# Patient Record
Sex: FEMALE | Race: WHITE | Employment: PART TIME | URBAN - METROPOLITAN AREA
[De-identification: names, ages, dates, MRNs, and addresses within clinical notes are randomized per-mention and may not be internally consistent; named-entity substitution may affect disease eponyms.]

---

## 2019-06-13 PROBLEM — Z34.90 PREGNANCY: Status: ACTIVE | Noted: 2019-06-13

## 2019-06-17 PROBLEM — Z28.39 RUBELLA NON-IMMUNE STATUS, ANTEPARTUM: Status: ACTIVE | Noted: 2019-06-17

## 2019-06-17 PROBLEM — O09.899 RUBELLA NON-IMMUNE STATUS, ANTEPARTUM: Status: ACTIVE | Noted: 2019-06-17

## 2019-06-20 PROBLEM — O09.91 HIGH-RISK PREGNANCY IN FIRST TRIMESTER: Status: ACTIVE | Noted: 2019-06-13

## 2019-06-20 PROBLEM — O99.211 OBESITY AFFECTING PREGNANCY IN FIRST TRIMESTER: Status: ACTIVE | Noted: 2019-06-13

## 2019-06-21 PROBLEM — Z36.82 NUCHAL TRANSLUCENCY OF FETUS ON PRENATAL ULTRASOUND: Status: ACTIVE | Noted: 2019-06-21

## 2019-06-24 PROBLEM — Z86.69 HISTORY OF SEIZURES AS A CHILD: Status: ACTIVE | Noted: 2019-06-24

## 2019-08-06 PROBLEM — Z36.82 NUCHAL TRANSLUCENCY OF FETUS ON PRENATAL ULTRASOUND: Status: RESOLVED | Noted: 2019-06-21 | Resolved: 2019-08-06

## 2019-08-06 PROBLEM — Z86.69 HISTORY OF SEIZURES AS A CHILD: Status: RESOLVED | Noted: 2019-06-24 | Resolved: 2019-08-06

## 2019-08-07 PROBLEM — O09.92 HIGH-RISK PREGNANCY IN SECOND TRIMESTER: Status: ACTIVE | Noted: 2019-06-13

## 2019-08-07 PROBLEM — O99.212 OBESITY AFFECTING PREGNANCY IN SECOND TRIMESTER: Status: ACTIVE | Noted: 2019-06-13

## 2019-08-28 PROBLEM — Z23 ENCOUNTER FOR IMMUNIZATION: Status: ACTIVE | Noted: 2019-08-28

## 2019-09-26 PROBLEM — R03.0 ELEVATED BP WITHOUT DIAGNOSIS OF HYPERTENSION: Status: ACTIVE | Noted: 2019-09-26

## 2019-10-02 PROBLEM — O16.2 ELEVATED BLOOD PRESSURE AFFECTING PREGNANCY IN SECOND TRIMESTER, ANTEPARTUM: Status: ACTIVE | Noted: 2019-09-26

## 2019-10-29 PROBLEM — O09.93 HIGH-RISK PREGNANCY IN THIRD TRIMESTER: Status: ACTIVE | Noted: 2019-06-13

## 2019-10-29 PROBLEM — O16.3 ELEVATED BLOOD PRESSURE AFFECTING PREGNANCY IN THIRD TRIMESTER, ANTEPARTUM: Status: ACTIVE | Noted: 2019-09-26

## 2019-11-01 PROBLEM — O99.213 OBESITY AFFECTING PREGNANCY IN THIRD TRIMESTER: Status: ACTIVE | Noted: 2019-06-13

## 2019-11-29 ENCOUNTER — HOSPITAL ENCOUNTER (EMERGENCY)
Age: 29
Discharge: HOME | End: 2019-11-29
Attending: OBSTETRICS & GYNECOLOGY | Admitting: OBSTETRICS & GYNECOLOGY
Payer: COMMERCIAL

## 2019-11-29 LAB
COLLECT DURATION TIME UR: 24 HR
PROT 24H UR-MRATE: 203 MG/24HR
PROT UR-MCNC: 15 MG/DL
SPECIMEN VOL ?TM UR: 1350 ML

## 2019-11-29 PROCEDURE — 84156 ASSAY OF PROTEIN URINE: CPT

## 2019-11-29 PROCEDURE — 59025 FETAL NON-STRESS TEST: CPT

## 2019-11-29 NOTE — PROGRESS NOTES
Pt here for NST. 24 hour urine sent to lab. Pt does not have to wait on results before d/c per Dr. Concepcion Medina.

## 2019-12-06 PROBLEM — B95.1 POSITIVE GBS TEST: Status: ACTIVE | Noted: 2019-12-06

## 2019-12-23 ENCOUNTER — HOSPITAL ENCOUNTER (INPATIENT)
Age: 29
LOS: 4 days | Discharge: HOME | End: 2019-12-27
Attending: OBSTETRICS & GYNECOLOGY | Admitting: OBSTETRICS & GYNECOLOGY
Payer: COMMERCIAL

## 2019-12-23 DIAGNOSIS — O10.919 HTN IN PREGNANCY, CHRONIC: Primary | ICD-10-CM

## 2019-12-23 PROBLEM — Z3A.39 39 WEEKS GESTATION OF PREGNANCY: Status: ACTIVE | Noted: 2019-12-23

## 2019-12-23 PROBLEM — Z34.90 ENCOUNTER FOR PLANNED INDUCTION OF LABOR: Status: ACTIVE | Noted: 2019-12-23

## 2019-12-23 LAB
ABO + RH BLD: NORMAL
BLOOD BANK CMNT PATIENT-IMP: NORMAL
BLOOD GROUP ANTIBODIES SERPL: NORMAL
ERYTHROCYTE [DISTWIDTH] IN BLOOD BY AUTOMATED COUNT: 13.3 % (ref 11.9–14.6)
HCT VFR BLD AUTO: 35 % (ref 35.8–46.3)
HGB BLD-MCNC: 11.9 G/DL (ref 11.7–15.4)
MCH RBC QN AUTO: 29.9 PG (ref 26.1–32.9)
MCHC RBC AUTO-ENTMCNC: 34 G/DL (ref 31.4–35)
MCV RBC AUTO: 87.9 FL (ref 79.6–97.8)
NRBC # BLD: 0 K/UL (ref 0–0.2)
PLATELET # BLD AUTO: 230 K/UL (ref 150–450)
PMV BLD AUTO: 10.1 FL (ref 9.4–12.3)
RBC # BLD AUTO: 3.98 M/UL (ref 4.05–5.2)
SPECIMEN EXP DATE BLD: NORMAL
WBC # BLD AUTO: 13.9 K/UL (ref 4.3–11.1)

## 2019-12-23 PROCEDURE — 65270000029 HC RM PRIVATE

## 2019-12-23 PROCEDURE — 86900 BLOOD TYPING SEROLOGIC ABO: CPT

## 2019-12-23 PROCEDURE — 74011250637 HC RX REV CODE- 250/637: Performed by: OBSTETRICS & GYNECOLOGY

## 2019-12-23 PROCEDURE — 75410000002 HC LABOR FEE PER 1 HR: Performed by: OBSTETRICS & GYNECOLOGY

## 2019-12-23 PROCEDURE — 85027 COMPLETE CBC AUTOMATED: CPT

## 2019-12-23 PROCEDURE — 36415 COLL VENOUS BLD VENIPUNCTURE: CPT

## 2019-12-23 RX ORDER — SODIUM CHLORIDE 0.9 % (FLUSH) 0.9 %
5-40 SYRINGE (ML) INJECTION EVERY 8 HOURS
Status: DISCONTINUED | OUTPATIENT
Start: 2019-12-23 | End: 2019-12-25

## 2019-12-23 RX ORDER — OXYTOCIN/RINGER'S LACTATE 30/500 ML
0-25 PLASTIC BAG, INJECTION (ML) INTRAVENOUS
Status: DISCONTINUED | OUTPATIENT
Start: 2019-12-23 | End: 2019-12-25 | Stop reason: HOSPADM

## 2019-12-23 RX ORDER — LIDOCAINE HYDROCHLORIDE 20 MG/ML
JELLY TOPICAL
Status: ACTIVE | OUTPATIENT
Start: 2019-12-23 | End: 2019-12-24

## 2019-12-23 RX ORDER — MINERAL OIL
120 OIL (ML) ORAL
Status: ACTIVE | OUTPATIENT
Start: 2019-12-23 | End: 2019-12-24

## 2019-12-23 RX ORDER — OXYTOCIN/RINGER'S LACTATE 15/250 ML
250 PLASTIC BAG, INJECTION (ML) INTRAVENOUS ONCE
Status: ACTIVE | OUTPATIENT
Start: 2019-12-23 | End: 2019-12-24

## 2019-12-23 RX ORDER — BUTORPHANOL TARTRATE 2 MG/ML
1 INJECTION INTRAMUSCULAR; INTRAVENOUS
Status: DISCONTINUED | OUTPATIENT
Start: 2019-12-23 | End: 2019-12-25 | Stop reason: HOSPADM

## 2019-12-23 RX ORDER — LIDOCAINE HYDROCHLORIDE 10 MG/ML
1 INJECTION INFILTRATION; PERINEURAL
Status: ACTIVE | OUTPATIENT
Start: 2019-12-23 | End: 2019-12-24

## 2019-12-23 RX ORDER — SODIUM CHLORIDE 0.9 % (FLUSH) 0.9 %
5-40 SYRINGE (ML) INJECTION AS NEEDED
Status: DISCONTINUED | OUTPATIENT
Start: 2019-12-23 | End: 2019-12-25

## 2019-12-23 RX ORDER — DEXTROSE, SODIUM CHLORIDE, SODIUM LACTATE, POTASSIUM CHLORIDE, AND CALCIUM CHLORIDE 5; .6; .31; .03; .02 G/100ML; G/100ML; G/100ML; G/100ML; G/100ML
125 INJECTION, SOLUTION INTRAVENOUS CONTINUOUS
Status: DISCONTINUED | OUTPATIENT
Start: 2019-12-23 | End: 2019-12-25

## 2019-12-23 RX ADMIN — MISOPROSTOL 50 MCG: 100 TABLET ORAL at 18:55

## 2019-12-23 RX ADMIN — MISOPROSTOL 50 MCG: 100 TABLET ORAL at 23:11

## 2019-12-23 NOTE — PROGRESS NOTES
SVE as documented. Cytotec given as ordered. Dr. Sondra Aquino called and updated. PCN to be given at second dose of cytotec or sooner if pt is progressing.

## 2019-12-23 NOTE — PROGRESS NOTES
Pt presented for scheduled induction. Pt states she had a phone interview with [address] [place] [address] RN and the information that was obtained has not changed. POC reviewed. IV started, Consents witnessed. Lab work drawn, sent to lab.

## 2019-12-24 ENCOUNTER — ANESTHESIA (OUTPATIENT)
Dept: LABOR AND DELIVERY | Age: 29
End: 2019-12-24
Payer: COMMERCIAL

## 2019-12-24 ENCOUNTER — ANESTHESIA EVENT (OUTPATIENT)
Dept: LABOR AND DELIVERY | Age: 29
End: 2019-12-24
Payer: COMMERCIAL

## 2019-12-24 LAB
ALBUMIN SERPL-MCNC: 2.6 G/DL (ref 3.5–5)
ALBUMIN/GLOB SERPL: 0.7 {RATIO} (ref 1.2–3.5)
ALP SERPL-CCNC: 169 U/L (ref 50–136)
ALT SERPL-CCNC: 20 U/L (ref 12–65)
ANION GAP SERPL CALC-SCNC: 7 MMOL/L (ref 7–16)
AST SERPL-CCNC: 16 U/L (ref 15–37)
BILIRUB SERPL-MCNC: 0.3 MG/DL (ref 0.2–1.1)
BUN SERPL-MCNC: 12 MG/DL (ref 6–23)
CALCIUM SERPL-MCNC: 9.3 MG/DL (ref 8.3–10.4)
CHLORIDE SERPL-SCNC: 107 MMOL/L (ref 98–107)
CO2 SERPL-SCNC: 23 MMOL/L (ref 21–32)
CREAT SERPL-MCNC: 0.68 MG/DL (ref 0.6–1)
GLOBULIN SER CALC-MCNC: 3.8 G/DL (ref 2.3–3.5)
GLUCOSE SERPL-MCNC: 90 MG/DL (ref 65–100)
POTASSIUM SERPL-SCNC: 4.3 MMOL/L (ref 3.5–5.1)
PROT SERPL-MCNC: 6.4 G/DL (ref 6.3–8.2)
SODIUM SERPL-SCNC: 137 MMOL/L (ref 136–145)

## 2019-12-24 PROCEDURE — 77030032490 HC SLV COMPR SCD KNE COVD -B: Performed by: OBSTETRICS & GYNECOLOGY

## 2019-12-24 PROCEDURE — 77030014125 HC TY EPDRL BBMI -B: Performed by: NURSE ANESTHETIST, CERTIFIED REGISTERED

## 2019-12-24 PROCEDURE — 74011250637 HC RX REV CODE- 250/637: Performed by: OBSTETRICS & GYNECOLOGY

## 2019-12-24 PROCEDURE — 74011000250 HC RX REV CODE- 250: Performed by: ANESTHESIOLOGY

## 2019-12-24 PROCEDURE — 74011250636 HC RX REV CODE- 250/636: Performed by: NURSE ANESTHETIST, CERTIFIED REGISTERED

## 2019-12-24 PROCEDURE — 77030018836 HC SOL IRR NACL ICUM -A: Performed by: OBSTETRICS & GYNECOLOGY

## 2019-12-24 PROCEDURE — 75410000002 HC LABOR FEE PER 1 HR: Performed by: OBSTETRICS & GYNECOLOGY

## 2019-12-24 PROCEDURE — 77030002933 HC SUT MCRYL J&J -A: Performed by: OBSTETRICS & GYNECOLOGY

## 2019-12-24 PROCEDURE — 74011250636 HC RX REV CODE- 250/636: Performed by: OBSTETRICS & GYNECOLOGY

## 2019-12-24 PROCEDURE — 3E0P7VZ INTRODUCTION OF HORMONE INTO FEMALE REPRODUCTIVE, VIA NATURAL OR ARTIFICIAL OPENING: ICD-10-PCS | Performed by: OBSTETRICS & GYNECOLOGY

## 2019-12-24 PROCEDURE — 76060000078 HC EPIDURAL ANESTHESIA: Performed by: OBSTETRICS & GYNECOLOGY

## 2019-12-24 PROCEDURE — 76010000392 HC C SECN EA ADDL 0.5 HR: Performed by: OBSTETRICS & GYNECOLOGY

## 2019-12-24 PROCEDURE — 77030031139 HC SUT VCRL2 J&J -A: Performed by: OBSTETRICS & GYNECOLOGY

## 2019-12-24 PROCEDURE — 74011000250 HC RX REV CODE- 250: Performed by: NURSE ANESTHETIST, CERTIFIED REGISTERED

## 2019-12-24 PROCEDURE — 75410000003 HC RECOV DEL/VAG/CSECN EA 0.5 HR: Performed by: OBSTETRICS & GYNECOLOGY

## 2019-12-24 PROCEDURE — 80053 COMPREHEN METABOLIC PANEL: CPT

## 2019-12-24 PROCEDURE — 36415 COLL VENOUS BLD VENIPUNCTURE: CPT

## 2019-12-24 PROCEDURE — 74011000258 HC RX REV CODE- 258: Performed by: OBSTETRICS & GYNECOLOGY

## 2019-12-24 PROCEDURE — 65270000029 HC RM PRIVATE

## 2019-12-24 PROCEDURE — 77030019905 HC CATH URETH INTMIT MDII -A

## 2019-12-24 PROCEDURE — 77030018846 HC SOL IRR STRL H20 ICUM -A: Performed by: OBSTETRICS & GYNECOLOGY

## 2019-12-24 PROCEDURE — 76010000391 HC C SECN FIRST 1 HR: Performed by: OBSTETRICS & GYNECOLOGY

## 2019-12-24 PROCEDURE — 77030034696 HC CATH URETH FOL 2W BARD -A: Performed by: OBSTETRICS & GYNECOLOGY

## 2019-12-24 PROCEDURE — A4300 CATH IMPL VASC ACCESS PORTAL: HCPCS | Performed by: NURSE ANESTHETIST, CERTIFIED REGISTERED

## 2019-12-24 PROCEDURE — 82803 BLOOD GASES ANY COMBINATION: CPT

## 2019-12-24 RX ORDER — LIDOCAINE HYDROCHLORIDE AND EPINEPHRINE 15; 5 MG/ML; UG/ML
INJECTION, SOLUTION EPIDURAL
Status: COMPLETED | OUTPATIENT
Start: 2019-12-24 | End: 2019-12-24

## 2019-12-24 RX ORDER — CEFAZOLIN SODIUM/WATER 2 G/20 ML
2 SYRINGE (ML) INTRAVENOUS ONCE
Status: DISCONTINUED | OUTPATIENT
Start: 2019-12-25 | End: 2019-12-24

## 2019-12-24 RX ORDER — PHENYLEPHRINE HYDROCHLORIDE 10 MG/ML
INJECTION INTRAVENOUS AS NEEDED
Status: DISCONTINUED | OUTPATIENT
Start: 2019-12-24 | End: 2019-12-25 | Stop reason: HOSPADM

## 2019-12-24 RX ORDER — ONDANSETRON 2 MG/ML
INJECTION INTRAMUSCULAR; INTRAVENOUS AS NEEDED
Status: DISCONTINUED | OUTPATIENT
Start: 2019-12-24 | End: 2019-12-25 | Stop reason: HOSPADM

## 2019-12-24 RX ORDER — ROPIVACAINE HYDROCHLORIDE 2 MG/ML
INJECTION, SOLUTION EPIDURAL; INFILTRATION; PERINEURAL
Status: DISCONTINUED | OUTPATIENT
Start: 2019-12-24 | End: 2019-12-25 | Stop reason: HOSPADM

## 2019-12-24 RX ORDER — LIDOCAINE HYDROCHLORIDE AND EPINEPHRINE 20; 5 MG/ML; UG/ML
INJECTION, SOLUTION EPIDURAL; INFILTRATION; INTRACAUDAL; PERINEURAL AS NEEDED
Status: DISCONTINUED | OUTPATIENT
Start: 2019-12-24 | End: 2019-12-25 | Stop reason: HOSPADM

## 2019-12-24 RX ORDER — SODIUM CHLORIDE, SODIUM LACTATE, POTASSIUM CHLORIDE, CALCIUM CHLORIDE 600; 310; 30; 20 MG/100ML; MG/100ML; MG/100ML; MG/100ML
INJECTION, SOLUTION INTRAVENOUS
Status: DISCONTINUED | OUTPATIENT
Start: 2019-12-24 | End: 2019-12-25 | Stop reason: HOSPADM

## 2019-12-24 RX ADMIN — PENICILLIN G POTASSIUM 2.5 MILLION UNITS: 20000000 POWDER, FOR SOLUTION INTRAVENOUS at 08:38

## 2019-12-24 RX ADMIN — LIDOCAINE HYDROCHLORIDE,EPINEPHRINE BITARTRATE 5 ML: 20; .005 INJECTION, SOLUTION EPIDURAL; INFILTRATION; INTRACAUDAL; PERINEURAL at 23:24

## 2019-12-24 RX ADMIN — PENICILLIN G POTASSIUM 2.5 MILLION UNITS: 20000000 POWDER, FOR SOLUTION INTRAVENOUS at 12:25

## 2019-12-24 RX ADMIN — Medication 500 ML/HR: at 23:48

## 2019-12-24 RX ADMIN — CEFAZOLIN 3 G: 1 INJECTION, POWDER, FOR SOLUTION INTRAVENOUS at 23:13

## 2019-12-24 RX ADMIN — SODIUM CHLORIDE 5 MILLION UNITS: 900 INJECTION, SOLUTION INTRAVENOUS at 00:05

## 2019-12-24 RX ADMIN — LIDOCAINE HYDROCHLORIDE,EPINEPHRINE BITARTRATE 5 ML: 20; .005 INJECTION, SOLUTION EPIDURAL; INFILTRATION; INTRACAUDAL; PERINEURAL at 23:44

## 2019-12-24 RX ADMIN — LIDOCAINE HYDROCHLORIDE,EPINEPHRINE BITARTRATE 3.5 ML: 15; .005 INJECTION, SOLUTION EPIDURAL; INFILTRATION; INTRACAUDAL; PERINEURAL at 12:00

## 2019-12-24 RX ADMIN — Medication 8 ML/HR: at 12:00

## 2019-12-24 RX ADMIN — SODIUM CHLORIDE, SODIUM LACTATE, POTASSIUM CHLORIDE, CALCIUM CHLORIDE, AND DEXTROSE MONOHYDRATE 125 ML/HR: 600; 310; 30; 20; 5 INJECTION, SOLUTION INTRAVENOUS at 12:27

## 2019-12-24 RX ADMIN — LIDOCAINE HYDROCHLORIDE,EPINEPHRINE BITARTRATE 5 ML: 20; .005 INJECTION, SOLUTION EPIDURAL; INFILTRATION; INTRACAUDAL; PERINEURAL at 23:22

## 2019-12-24 RX ADMIN — PHENYLEPHRINE HYDROCHLORIDE 160 MCG: 10 INJECTION INTRAVENOUS at 23:59

## 2019-12-24 RX ADMIN — AZITHROMYCIN MONOHYDRATE 500 MG: 500 INJECTION, POWDER, LYOPHILIZED, FOR SOLUTION INTRAVENOUS at 23:13

## 2019-12-24 RX ADMIN — SODIUM CHLORIDE, SODIUM LACTATE, POTASSIUM CHLORIDE, AND CALCIUM CHLORIDE: 600; 310; 30; 20 INJECTION, SOLUTION INTRAVENOUS at 23:24

## 2019-12-24 RX ADMIN — MISOPROSTOL 50 MCG: 100 TABLET ORAL at 04:12

## 2019-12-24 RX ADMIN — PENICILLIN G POTASSIUM 2.5 MILLION UNITS: 20000000 POWDER, FOR SOLUTION INTRAVENOUS at 22:45

## 2019-12-24 RX ADMIN — LIDOCAINE HYDROCHLORIDE,EPINEPHRINE BITARTRATE 5 ML: 20; .005 INJECTION, SOLUTION EPIDURAL; INFILTRATION; INTRACAUDAL; PERINEURAL at 23:18

## 2019-12-24 RX ADMIN — PHENYLEPHRINE HYDROCHLORIDE 160 MCG: 10 INJECTION INTRAVENOUS at 23:41

## 2019-12-24 RX ADMIN — Medication 2 MILLI-UNITS/MIN: at 09:17

## 2019-12-24 RX ADMIN — PHENYLEPHRINE HYDROCHLORIDE 160 MCG: 10 INJECTION INTRAVENOUS at 23:50

## 2019-12-24 RX ADMIN — PENICILLIN G POTASSIUM 2.5 MILLION UNITS: 20000000 POWDER, FOR SOLUTION INTRAVENOUS at 18:31

## 2019-12-24 RX ADMIN — ONDANSETRON 4 MG: 2 INJECTION INTRAMUSCULAR; INTRAVENOUS at 23:48

## 2019-12-24 RX ADMIN — PENICILLIN G POTASSIUM 2.5 MILLION UNITS: 20000000 POWDER, FOR SOLUTION INTRAVENOUS at 04:08

## 2019-12-24 NOTE — PROGRESS NOTES
[facility] Update given. Strip reviewed. Md states to continue increasing pitocin.

## 2019-12-24 NOTE — H&P
[facility]havin [facility]  [id number]      History and Physical  Subjective:     Patient is a 30 y.o.  female presents with induction for CHTN at 39 weeks. See office notes on prenatal care. Has felt some cramping overnight was checked on Friday and noted to be 1/70  CMP pending  CBC normal   No s/s of PEC  Lab Results   Component Value Date/Time    ABO/Rh(D) O POSITIVE 12/23/2019 06:34 PM    Antibody screen, External negative 06/13/2019    Antibody screen NEG 12/23/2019 06:34 PM    Rubella, External non immune 06/13/2019    GrBStrep, External Positive 12/03/2019    HBsAg, External negative 06/13/2019    HIV, External non reactive  06/13/2019    RPR, External non reactive 06/13/2019    ABO,Rh O positive 06/13/2019               Patient Active Problem List    Diagnosis Date Noted    HTN in pregnancy, chronic 12/23/2019    39 weeks gestation of pregnancy 12/23/2019    Encounter for planned induction of labor 12/23/2019    Positive GBS test 12/06/2019    Elevated blood pressure affecting pregnancy in third trimester, antepartum 09/26/2019    Encounter for immunization 08/28/2019    Rubella non-immune status, antepartum 06/17/2019    High-risk pregnancy in third trimester 06/13/2019    Obesity affecting pregnancy in third trimester 06/13/2019     Past Medical History:   Diagnosis Date    Epilepsy [facility])     as a child - nothing since    History of seizures as a child 6/24/2019 6/24/2019 at [facility]: Was followed by neuro and treated with Keppra, none since.  HTN in pregnancy, chronic 12/23/2019      Past Surgical History:   Procedure Laterality Date    HX OTHER SURGICAL      Right arm (plate and 4 screws due to broken arm)    HX TONSIL AND ADENOIDECTOMY        Prior to Admission Medications   Prescriptions Last Dose Informant Patient Reported?  Taking?   calcium carbonate (CALCIUM 500 PO) 12/23/2019 at Unknown time  Yes Yes   Sig: Take  by mouth.   calcium carbonate (TUMS) 200 mg calcium (500 mg) chew 12/23/2019 at Unknown time  Yes Yes   Sig: Take 1 Tab by mouth daily. cholecalciferol (VITAMIN D3) 2,000 unit cap capsule 12/23/2019 at Unknown time  Yes Yes   Sig: Take  by mouth two (2) times a day. ferrous sulfate ER (SLOW RELEASE IRON) 160 mg (50 mg iron) TbER tablet 12/16/2019 at Unknown time  Yes Yes   Sig: Take 1 Tab by mouth every Tuesday and Friday. ondansetron (ZOFRAN ODT) 4 mg disintegrating tablet 12/16/2019 at Unknown time  No Yes   Sig: Take 1 Tab by mouth every eight (8) hours as needed for Nausea. Indications: Excessive Vomiting in Pregnancy   prenatal 47/iron/folate 1/dha (PNV-DHA PO) 12/23/2019 at Unknown time  Yes Yes   Sig: Take  by mouth. Facility-Administered Medications: None     No Known Allergies   Social History     Tobacco Use    Smoking status: Never Smoker    Smokeless tobacco: Never Used   Substance Use Topics    Alcohol use: Not Currently      Family History   Problem Relation Age of Onset    Hypertension Mother     Diabetes Mother     Diabetes Father           Objective:     Patient Vitals for the past 8 hrs:   BP Temp Pulse Resp   12/24/19 0841 140/75 98.8 °F (37.1 °C) 80 18        Fetal Monitoring:    Patient Vitals for the past 4 hrs:    Mode Fetal Heart Rate Variability Decelerations Accelerations   12/24/19 0630 External 140 6-25 BPM None Yes   12/24/19 0615 External 130 6-25 BPM None Yes   12/24/19 0600 External 130 6-25 BPM None Yes   12/24/19 0545 External 125 6-25 BPM None Yes   12/24/19 0530  125 6-25 BPM None Yes   12/24/19 0515 External 125 6-25 BPM None Yes      Uterine Activity: Frequency: Every 1 on monitor- heath- pt not feeling minutes   Dilation: 2 -3cm   Effacement: 50%   Station: -3  AROM clear  Assessment:     Principal Problem:    HTN in pregnancy, chronic (12/23/2019)    Active Problems:    39 weeks gestation of pregnancy (12/23/2019)      Encounter for planned induction of labor (12/23/2019)        Plan:     Reassuring fetal status, Labor Progressing normally, Continue plan for vaginal delivery   Pts other in room with same body habitus and had vaginal delivery.   Recent EFW- 2 weeks ago 3305 grams HC 81%, AC 72% 7'5\" EFW 62%  Pt passed both glucolas  Weight gain 34 pounds

## 2019-12-24 NOTE — PROGRESS NOTES
12/24/19 0904   Membranes   Membrane Status AROM   Rupture Date 12/24/19   Rupture Time 0904   Odor None   Amniotic Fluid Description Clear   Amniotic Fluid Volume  Moderate

## 2019-12-24 NOTE — PROGRESS NOTES
08:38 Medication New Bag penicillin G pot (PFIZERPEN) 2.5 Million Units in 50 ml 0.9% NaCl -  Dose: 2.5 Million Units ; Rate: 100 mL/hr ; Route: IntraVENous ; Line: Peripheral IV 12/23/19 Right Hand ; Scheduled Time: 0700  Casi Prajapati RN

## 2019-12-24 NOTE — PROGRESS NOTES
PT still comfortable not feeling contractions  Still leaking clear fluid  Pitocin infusing  Patient Vitals for the past 4 hrs:    Mode Fetal Heart Rate Variability Decelerations Accelerations   12/24/19 0630 External 140 6-25 BPM None Yes   12/24/19 0615 External 130 6-25 BPM None Yes   12/24/19 0600 External 130 6-25 BPM None Yes   12/24/19 0545 External 125 6-25 BPM None Yes

## 2019-12-24 NOTE — PROGRESS NOTES
Labor Progress Note  Patient seen, fetal heart rate and contraction pattern evaluated, patient examined. Patient Vitals for the past 1 hrs:   BP Pulse   12/24/19 1344 110/64 88   12/24/19 1330 109/66 87       Physical Exam:  Cervical Exam:  3/70 %/-3/   Membranes:  Spontaneous Rupture of Membranes; Amniotic Fluid: clear fluid  Uterine Activity: irregular difficult to monitor due to patient position.   Being repositioned now  Fetal Heart Rate: reassuring    Assessment/Plan:  Reassuring fetal status, continue with induction

## 2019-12-24 NOTE — ANESTHESIA PROCEDURE NOTES
Epidural Block    Start time: 12/24/2019 11:46 AM  End time: 12/24/2019 12:10 PM  Performed by: Jennifer Chicas MD  Authorized by: Jennifer Chicas MD     Pre-Procedure  Indication: labor epidural    Preanesthetic Checklist: patient identified, risks and benefits discussed, anesthesia consent, site marked, patient being monitored, timeout performed and anesthesia consent    Timeout Time: 11:48        Epidural:   Patient position:  Seated  Prep: Chlorhexidine    Location:  L3-4    Needle and Epidural Catheter:   Needle Type:  Tuohy  Needle Gauge:  17 G  Injection Technique:  Loss of resistance using air  Attempts:  2  Catheter Size:  19 G  Catheter at Skin Depth (cm):  15  Depth in Epidural Space (cm):  6  Events: no blood with aspiration, no cerebrospinal fluid with aspiration, no paresthesia and negative aspiration test    Test Dose:  Negative    Assessment:   Catheter Secured:  Tegaderm and tape  Insertion:  Uncomplicated  Patient tolerance:  Patient tolerated the procedure well with no immediate complications

## 2019-12-24 NOTE — PROGRESS NOTES
12/24/19 1430   Straight Cath   Straight Cath Nurse performed cath   Number of Attempts 1   Catheter Size 16 FR   Time Catheter Inserted 1430   Time Catheter Removed 1430   Urine 150 mL   Urine Assessment   Urinary Status Self cath   Urine Color Yellow/straw   Urine Appearance Clear

## 2019-12-24 NOTE — PROGRESS NOTES
Birdie Lay Dr Sanford Hammans at bedside at [phone]. CRNA Louis at bedside at 1145    Assisted pt to sitting up on bedside at 1144. Timeout completed at [phone] with MD, SYDNIE and myself at bedside. Test dose given at 1200. Negative reaction. Pt assisted to lying back in left tilt position. See anesthesia record for details. See vital sign flow sheet for BP. Tolerated procedure well.

## 2019-12-25 LAB
ARTERIAL PATENCY WRIST A: ABNORMAL
ARTERIAL PATENCY WRIST A: ABNORMAL
BASE DEFICIT BLD-SCNC: 6 MMOL/L
BASE DEFICIT BLD-SCNC: 6 MMOL/L
BDY SITE: ABNORMAL
BDY SITE: ABNORMAL
BODY TEMPERATURE: 98.6
BODY TEMPERATURE: 98.6
CO2 BLD-SCNC: 25 MMOL/L
CO2 BLD-SCNC: 27 MMOL/L
COLLECT TIME,HTIME: 2346
COLLECT TIME,HTIME: 2346
GAS FLOW.O2 O2 DELIVERY SYS: ABNORMAL L/MIN
GAS FLOW.O2 O2 DELIVERY SYS: ABNORMAL L/MIN
HCO3 BLD-SCNC: 24.6 MMOL/L (ref 22–26)
HCO3 BLDV-SCNC: 23.4 MMOL/L (ref 23–28)
HCT VFR BLD AUTO: 29.1 % (ref 35.8–46.3)
HGB BLD-MCNC: 9.8 G/DL (ref 11.7–15.4)
PCO2 BLDCO: 61 MMHG (ref 32–68)
PCO2 BLDCO: 71 MMHG (ref 32–68)
PH BLDCO: 7.15 [PH] (ref 7.15–7.38)
PH BLDCO: 7.19 [PH] (ref 7.15–7.38)
PO2 BLDCO: 12 MMHG
PO2 BLDCO: 16 MMHG
SAO2 % BLD: 8 % (ref 95–98)
SAO2 % BLDV: 14 % (ref 65–88)
SERVICE CMNT-IMP: ABNORMAL
SERVICE CMNT-IMP: ABNORMAL
SPECIMEN TYPE: ABNORMAL
SPECIMEN TYPE: ABNORMAL

## 2019-12-25 PROCEDURE — 65270000029 HC RM PRIVATE

## 2019-12-25 PROCEDURE — 74011250636 HC RX REV CODE- 250/636: Performed by: NURSE ANESTHETIST, CERTIFIED REGISTERED

## 2019-12-25 PROCEDURE — 74011250637 HC RX REV CODE- 250/637: Performed by: ANESTHESIOLOGY

## 2019-12-25 PROCEDURE — 74011250636 HC RX REV CODE- 250/636: Performed by: ANESTHESIOLOGY

## 2019-12-25 PROCEDURE — 74011000250 HC RX REV CODE- 250: Performed by: NURSE ANESTHETIST, CERTIFIED REGISTERED

## 2019-12-25 PROCEDURE — 74011250636 HC RX REV CODE- 250/636: Performed by: OBSTETRICS & GYNECOLOGY

## 2019-12-25 PROCEDURE — 85018 HEMOGLOBIN: CPT

## 2019-12-25 PROCEDURE — 36415 COLL VENOUS BLD VENIPUNCTURE: CPT

## 2019-12-25 RX ORDER — MORPHINE SULFATE 0.5 MG/ML
INJECTION, SOLUTION EPIDURAL; INTRATHECAL; INTRAVENOUS AS NEEDED
Status: DISCONTINUED | OUTPATIENT
Start: 2019-12-25 | End: 2019-12-25 | Stop reason: HOSPADM

## 2019-12-25 RX ORDER — SODIUM CHLORIDE 0.9 % (FLUSH) 0.9 %
5-40 SYRINGE (ML) INJECTION AS NEEDED
Status: DISCONTINUED | OUTPATIENT
Start: 2019-12-25 | End: 2019-12-26

## 2019-12-25 RX ORDER — EPHEDRINE SULFATE/0.9% NACL/PF 50 MG/5 ML
SYRINGE (ML) INTRAVENOUS AS NEEDED
Status: DISCONTINUED | OUTPATIENT
Start: 2019-12-25 | End: 2019-12-25 | Stop reason: HOSPADM

## 2019-12-25 RX ORDER — OXYCODONE HYDROCHLORIDE 5 MG/1
5 TABLET ORAL
Status: DISCONTINUED | OUTPATIENT
Start: 2019-12-25 | End: 2019-12-27 | Stop reason: HOSPADM

## 2019-12-25 RX ORDER — IBUPROFEN 800 MG/1
800 TABLET ORAL EVERY 6 HOURS
Status: DISCONTINUED | OUTPATIENT
Start: 2019-12-25 | End: 2019-12-27 | Stop reason: HOSPADM

## 2019-12-25 RX ORDER — OXYCODONE HYDROCHLORIDE 5 MG/1
10 TABLET ORAL
Status: DISPENSED | OUTPATIENT
Start: 2019-12-25 | End: 2019-12-26

## 2019-12-25 RX ORDER — DOCUSATE SODIUM 100 MG/1
100 CAPSULE, LIQUID FILLED ORAL 2 TIMES DAILY
Status: DISCONTINUED | OUTPATIENT
Start: 2019-12-26 | End: 2019-12-27 | Stop reason: HOSPADM

## 2019-12-25 RX ORDER — KETOROLAC TROMETHAMINE 30 MG/ML
30 INJECTION, SOLUTION INTRAMUSCULAR; INTRAVENOUS
Status: DISPENSED | OUTPATIENT
Start: 2019-12-25 | End: 2019-12-26

## 2019-12-25 RX ORDER — SODIUM CHLORIDE 0.9 % (FLUSH) 0.9 %
5-40 SYRINGE (ML) INJECTION EVERY 8 HOURS
Status: DISCONTINUED | OUTPATIENT
Start: 2019-12-26 | End: 2019-12-26

## 2019-12-25 RX ORDER — SODIUM CHLORIDE, SODIUM LACTATE, POTASSIUM CHLORIDE, CALCIUM CHLORIDE 600; 310; 30; 20 MG/100ML; MG/100ML; MG/100ML; MG/100ML
125 INJECTION, SOLUTION INTRAVENOUS CONTINUOUS
Status: DISCONTINUED | OUTPATIENT
Start: 2019-12-25 | End: 2019-12-26

## 2019-12-25 RX ORDER — NALOXONE HYDROCHLORIDE 0.4 MG/ML
0.4 INJECTION, SOLUTION INTRAMUSCULAR; INTRAVENOUS; SUBCUTANEOUS AS NEEDED
Status: DISCONTINUED | OUTPATIENT
Start: 2019-12-25 | End: 2019-12-26

## 2019-12-25 RX ORDER — ACETAMINOPHEN 325 MG/1
650 TABLET ORAL
Status: DISCONTINUED | OUTPATIENT
Start: 2019-12-25 | End: 2019-12-27 | Stop reason: HOSPADM

## 2019-12-25 RX ORDER — SIMETHICONE 80 MG
80 TABLET,CHEWABLE ORAL
Status: DISCONTINUED | OUTPATIENT
Start: 2019-12-26 | End: 2019-12-27 | Stop reason: HOSPADM

## 2019-12-25 RX ORDER — SODIUM CHLORIDE, SODIUM LACTATE, POTASSIUM CHLORIDE, CALCIUM CHLORIDE 600; 310; 30; 20 MG/100ML; MG/100ML; MG/100ML; MG/100ML
25 INJECTION, SOLUTION INTRAVENOUS CONTINUOUS
Status: DISCONTINUED | OUTPATIENT
Start: 2019-12-25 | End: 2019-12-26

## 2019-12-25 RX ORDER — NALOXONE HYDROCHLORIDE 0.4 MG/ML
0.2 INJECTION, SOLUTION INTRAMUSCULAR; INTRAVENOUS; SUBCUTANEOUS
Status: ACTIVE | OUTPATIENT
Start: 2019-12-25 | End: 2019-12-26

## 2019-12-25 RX ORDER — OXYCODONE HYDROCHLORIDE 5 MG/1
10 TABLET ORAL
Status: DISCONTINUED | OUTPATIENT
Start: 2019-12-25 | End: 2019-12-27 | Stop reason: HOSPADM

## 2019-12-25 RX ORDER — HYDROMORPHONE HYDROCHLORIDE 1 MG/ML
1 INJECTION, SOLUTION INTRAMUSCULAR; INTRAVENOUS; SUBCUTANEOUS
Status: ACTIVE | OUTPATIENT
Start: 2019-12-25 | End: 2019-12-26

## 2019-12-25 RX ORDER — ONDANSETRON 4 MG/1
4 TABLET, ORALLY DISINTEGRATING ORAL
Status: DISCONTINUED | OUTPATIENT
Start: 2019-12-25 | End: 2019-12-27 | Stop reason: HOSPADM

## 2019-12-25 RX ADMIN — SODIUM CHLORIDE, SODIUM LACTATE, POTASSIUM CHLORIDE, AND CALCIUM CHLORIDE 25 ML/HR: 600; 310; 30; 20 INJECTION, SOLUTION INTRAVENOUS at 03:22

## 2019-12-25 RX ADMIN — MORPHINE SULFATE 5 MG: 0.5 INJECTION, SOLUTION EPIDURAL; INTRATHECAL; INTRAVENOUS at 00:15

## 2019-12-25 RX ADMIN — BUTORPHANOL TARTRATE 4 MG: 2 INJECTION, SOLUTION INTRAMUSCULAR; INTRAVENOUS at 00:15

## 2019-12-25 RX ADMIN — PHENYLEPHRINE HYDROCHLORIDE 160 MCG: 10 INJECTION INTRAVENOUS at 00:09

## 2019-12-25 RX ADMIN — KETOROLAC TROMETHAMINE 30 MG: 30 INJECTION, SOLUTION INTRAMUSCULAR at 05:45

## 2019-12-25 RX ADMIN — OXYCODONE 10 MG: 5 TABLET ORAL at 21:36

## 2019-12-25 RX ADMIN — Medication 25 MG: at 00:21

## 2019-12-25 NOTE — PROGRESS NOTES
SBAR OUT Report: Mother    Verbal report given to Joey Bartlett RN (full name & credentials) on this patient, who is now being transferred to [facility] (unit) for routine post op care. The patient is wearing a green \"Anesthesia-Duramorph\" band. Report consisted of patient's Situation, Background, Assessment and Recommendations (SBAR). Leupp ID bands were compared with the identification form, and verified with the patient and receiving nurse. Information from the SBAR and the [address] [facility] Report was reviewed with the receiving nurse; opportunity for questions and clarification provided.

## 2019-12-25 NOTE — PROGRESS NOTES
Labor Progress Note  Patient seen, fetal heart rate and contraction pattern evaluated, patient examined. No data found. Physical Exam:  Cervical Exam:  4/90 %/-2/Anterior Head is unengaged between contractions  Membranes:  prior SROM  Uterine Activity: Frequency: Every 2 minutes  Fetal Heart Rate: reassuring    Assessment/Plan:  Reassuring fetal status, Proceed with  Section Reassuring fetal status with labor not progressing normally despite pitocin, has not changed in 6 hours and head unengaged between contractions, findings consistent with failure of descent and failure of dilatation. Recommended proceeding with  delivery. Risks of bleeding, infection, bladder and bowel damage explained to patient's family. They understand the situation and consent to the  delivery.

## 2019-12-25 NOTE — ANESTHESIA POSTPROCEDURE EVALUATION
Labor epidural with  delivery under epidural anesthesia    epidural    Anesthesia Post Evaluation      Multimodal analgesia: multimodal analgesia used between 6 hours prior to anesthesia start to PACU discharge  Patient location during evaluation: PACU  Patient participation: complete - patient participated  Level of consciousness: awake and alert  Pain management: adequate  Airway patency: patent  Anesthetic complications: no  Cardiovascular status: acceptable  Respiratory status: acceptable  Hydration status: acceptable  Post anesthesia nausea and vomiting:  none      No vitals data found for the desired time range.

## 2019-12-25 NOTE — PROGRESS NOTES
Admission assessment complete as noted. Patient oriented to room and unit. Plan of care reviewed and patient verbalizes understanding. Questions encouraged and answered. Patent encouraged to call for needs or concerns. Safety Teaching reviewed:   1. Hand hygiene prior to handling the infant. 2. Bracelets with matching numbers are placed on mother and infant  3. An infant security tag  [facility]) is placed on the infant's ankle and monitored  4. All OB nurses wear pink Employee badges - do not give your baby to anyone without proper identification. 5. Never leave the baby alone in the room. 6. The infant should be placed on their back to sleep. on a firm mattress. No toys should be placed in the crib. (safe sleep video offered to view)  7. Never shake the baby (video offered to view)  8. Infant fall prevention - do not sleep with the baby, and place the baby in the crib while ambulating. 5. Mother and Baby Care booklet given to Mother.

## 2019-12-25 NOTE — PROGRESS NOTES
Patient is POD 1 s/p  and received neuraxial morphine for post-op pain control. Visit Vitals  /75 (BP 1 Location: Left arm, BP Patient Position: Sitting)   Pulse 100   Temp 36.9 °C (98.4 °F)   Resp 18   Ht 5' 1\" (1.549 m)   LMP 2019   SpO2 96%   Breastfeeding Yes   BMI 49.32 kg/m²   Patient appropriately hydrated and appears euvolemic. She reports minimal incisional pain. Patient reports no pruritis and no nausea. Her lower extremities have returned to baseline neurologically. She was satisfied with the anesthetic and reports no complications. Continue current orders.

## 2019-12-25 NOTE — PROGRESS NOTES
Patient pulled up in bed position change for increased comfort. Patient denies needs for anything at this time.

## 2019-12-25 NOTE — PROGRESS NOTES
12/25/19 0545   Pain Assessment   Pain Scale 1 Numeric (0 - 10)   Pain Intensity 1 1   Pain Location 1 Abdomen; Incisional   Pain Description 1 Aching; Sore   Pain Intervention(s) 1 Medication (see MAR)     PRN Toradol 30mg/IV for pain

## 2019-12-25 NOTE — PROGRESS NOTES
Labor Progress Note  Patient seen, fetal heart rate and contraction pattern evaluated, patient examined. Patient Vitals for the past 1 hrs:   BP Pulse   12/24/19 2031 106/56 (!) 104   12/24/19 2015 123/60 97       Physical Exam:  Cervical Exam:  4/90 %/-2/Anterior  Membranes:  Artificial Rupture of Membranes;  Amniotic Fluid: earlier today of clear fluid  Uterine Activity: Frequency: Every 2 minutes  Fetal Heart Rate: reassuring    Assessment/Plan:  Reassuring fetal status, continue induction

## 2019-12-25 NOTE — L&D DELIVERY NOTE
Delivery Summary    Patient: Lee Bruner MRN: [id number]  SSN: [id number]    YOB: 1990  Age: 30 y.o. Sex: female        Information for the patient's :  Ansley Arzate [[id number]]       Labor Events:    Labor: No    Steroids: None   Cervical Ripening Date/Time:       Cervical Ripening Type: Misoprostol   Antibiotics During Labor: Yes   Rupture Identifier:      Rupture Date/Time: 2019 9:04 AM   Rupture Type: AROM   Amniotic Fluid Volume: Moderate    Amniotic Fluid Description: Clear    Amniotic Fluid Odor: None    Induction: Oxytocin       Induction Date/Time: 2019 6:55 PM    Indications for Induction: Hypertension    Augmentation: None   Augmentation Date/Time:      Indications for Augmentation:     Labor complications: Failure to Progress in First Stage       Additional complications:        Delivery Events:  Indications For Episiotomy:     Episiotomy: None   Perineal Laceration(s): None   Repaired:     Periurethral Laceration Location:      Repaired:     Labial Laceration Location:     Repaired:     Sulcal Laceration Location:     Repaired:     Vaginal Laceration Location:     Repaired:     Cervical Laceration Location:     Repaired:     Repair Suture: None   Number of Repair Packets:     Estimated Blood Loss (ml):  ml     Delivery Date: 2019    Delivery Time: 11:46 PM   Delivery Type: , Low Transverse     Details    Trial of Labor: Yes   Primary/Repeat: Primary   Priority: Routine   Indications:  Arrest of Descent;Cephalopelvic Disproportion       Sex:  Male     Gestational Age: 36w3d  Delivery Clinician:   Kevin Fuentes  Living Status: Living   Delivery Location: [facility] OBED[facility]          APGARS  One minute Five minutes Ten minutes   Skin color: 1   1        Heart rate: 2   2        Grimace: 2   2        Muscle tone: 2   2        Breathin   2        Totals: 9   9          Presentation: Vertex    Position: Right Occiput Transverse  Resuscitation Method:  Suctioning-bulb; Tactile Stimulation     Meconium Stained:        Cord Information: 3 Vessels  Complications: Nuchal Cord Without Compressions  Cord around: head  Delayed cord clamping? Yes  Cord clamped date/time:   Disposition of Cord Blood: Lab    Blood Gases Sent?: Yes    Placenta:  Date/Time: 2019 11:49 PM  Removal: Expressed      Appearance: Normal;Intact      Measurements:  Birth Weight: 7 lb 4.1 oz (3.29 kg)      Birth Length: 1' 7.75\" (0.502 m)      Head Circumference: 1' 1.98\" (0.355 m)      Chest Circumference: 1' 0.4\" (0.315 m)     Abdominal Girth:       Other Providers:   Radha Jones, LUIS Plascencia;AYAD FARIAS;AUDREY DASILVA;JONATHAN BARCENAS, Obstetrician;Primary Nurse;Primary [place] Nurse;Neonatologist;Anesthesiologist;Crna;Respiratory Therapist             Group B Strep:   Lab Results   Component Value Date/Time    GrBStrep, External Positive 2019     Information for the patient's :  Priscila Valle [[id number]]   No results found for: ABORH, PCTABR, PCTDIG, BILI, ABORHEXT, ABORH    Recent Labs     19  0000 19  2359   PCO2CB 61 71*   PO2CB 16 12   HCO3I  --  24.6   SO2I  --  8*   IBD 6 6   PTEMPI 98.6 98.6   SPECTI VENOUS CORD ARTERIAL CORD   PHICB 7.190 7.146*   ISITE CORD CORD   IDEV ROOM AIR ROOM AIR   IALLEN NOT APPLICABLE NOT APPLICABLE

## 2019-12-25 NOTE — PROGRESS NOTES
Results for Jersey Lopez (MRN [id number]) as of 12/25/2019 00:06   Ref. Range 12/24/2019 23:59 12/25/2019 00:00   pCO2 cord blood Latest Ref Range: 32 - 68 mmHg 71 (H) 61   pO2 cord blood Latest Units: mmHg 12 16   HCO3 (POC) Latest Ref Range: 22 - 26 MMOL/L 24.6    sO2 (POC) Latest Ref Range: 95 - 98 % 8 (L)    Base deficit (POC) Latest Units: mmol/L 6 6   Patient temp.  Latest Units:   98.6 98.6   Specimen type (POC) Latest Units:   ARTERIAL CORD VENOUS CORD   pH, cord blood (POC) Latest Ref Range: 7.15 - 7.38   7.146 (L) 7.190   HCO3, venous (POC) Latest Ref Range: 23 - 28 MMOL/L  23.4   sO2, venous (POC) Latest Ref Range: 65 - 88 %  14 (L)   Site Latest Units:   CORD CORD   Device: Latest Units:   ROOM AIR ROOM AIR   Allens test (POC) Latest Units:   NOT APPLICABLE NOT APPLICABLE

## 2019-12-25 NOTE — PROGRESS NOTES
IV capped, Brunner d/c with bulb deflated with no difficulty, SCD's off, then pt assisted up to BR and shown how to do self amie care. Demo. Good understanding.

## 2019-12-25 NOTE — PROGRESS NOTES
Post-Operative Day Number 1/2 Progress Note    Patient doing well post-op day 1/2 from  delivery without significant complaints. Pain controlled on current medication. Brunner draining, normal lochia. Vitals:    Patient Vitals for the past 8 hrs:   BP Temp Pulse Resp SpO2   19 0607 132/68 99.7 °F (37.6 °C) 100 18 96 %   19 0505 112/52 98.6 °F (37 °C) (!) 112 18 96 %   19 0345 114/46 98.9 °F (37.2 °C) (!) 127 18 96 %   19 0245 126/69  (!) 114 18 94 %   19 0230 123/63  (!) 112 20 95 %   19 0215 139/62  (!) 110 18 96 %   19 0200 135/62  (!) 114 16 97 %   19 0145 121/58  (!) 130 18 97 %   19 0130 123/56  (!) 112 20 95 %   19 0115 118/54  (!) 120 18 96 %   19 0100 124/52  (!) 122 18 96 %   19 0041 101/51 98.6 °F (37 °C) (!) 132 20 96 %   19 2315 132/76  (!) 111       Temp (24hrs), Av.8 °F (37.1 °C), Min:98.4 °F (36.9 °C), Max:99.7 °F (37.6 °C)      Vital signs stable, afebrile. Exam:  Patient without distress. Abdomen soft, fundus firm at level of umbilicus, non tender. Lab/Data Review:  CBC:   Lab Results   Component Value Date/Time    HGB 9.8 (L) 2019 06:03 AM    HCT 29.1 (L) 2019 06:03 AM       Assessment and Plan:  Patient appears to be having uncomplicated post- course. Continue routine post-op care and maternal education.

## 2019-12-25 NOTE — PROGRESS NOTES
Entered room to find pt in bed and off peanut. Pt's mom stated \"she couldn't handle the peanut anymore so we took her off. \"   MD at bedside, NEIL performed.

## 2019-12-25 NOTE — OP NOTES
INTRAUTERINE PREGNANCY  SECTION     Nikolay Bear  [id number]    DATE OF PROCEDURE:  2019    PREOPERATIVE DIAGNOSIS:  Failure to Progress    POSTOPERATIVE DIAGNOSIS:  Same as preoperative diagnosis      with operative delivery of a 7 lb 4 oz male with Apgars of 9 and 9. ADDITIONAL DIAGNOSES: Mild uterine atony responded to pitocin in IVF and Cytotec buccal    PROCEDURE: Intrauterine pregnancy,  section low transverse    SURGEON:  Althea Benavidez MD    ASSISTANT:  none    ANESTHESIA: Epidural    EBL: 700 ml    SPECIMENS:   ID Type Source Tests Collected by Time Destination   1 :  Placenta   Sven Barrientos MD 2019 0009 Discarded        COMPLICATIONS: none    OPERATIVE PROCEDURE: Patient was placed on the operating room table in the supine position/left lateral tilt, pollock catheter in place, pneumatic compression hose on and operating. Time out was done to confirm the operating procedure, surgeon, patient and site. Once confirmed by the team, procedure was started. The patient was prepped and draped in the usual fashion for abdominal surgery. Adequate anesthesia was confirmed, A Pfannenstiel incision was made and carried down sharply through the skin, subcutaneous tissue, and fascia. Fascia was sharply dissected free from underlying rectus muscles. The peritoneum was sharply entered and extended vertically with good visualization of bowel and bladder. The bladder blade was then placed over the bladder. The visceroperitoneal reflection over the lower uterine segment was incised transversely and the bladder flap sharply and bluntly developed. The bladder blade was reinserted in this space. A low transverse hysterotomy incision was made with return of clear fluid then extended bluntly, and the infants head was delivered from the pelvis with gentle fundal pressure. The cord was clamped and cut. Mouth and nose were suctioned clean.  The infant was given to the [facility] personel present at the time of delivery. The placenta was delivered with fundal massage. The uterus was exteriorized, wrapped in a wet lap square, curetted with a dry square, and closed in a double-layered fashion with 0-vicryl. Mild atony noted and massage and buccal cytotec used. Hemostasis appeared adequate. The cul-de-sac was then irrigated and suctioned clean. Bladder flap was irrigated. The uterus was replaced in the abdomen. The gutters were irrigated and suctioned clean. The hysterotomy incision was noted to be hemostatic. The peritoneum was closed with 3-0 vicryl and rectus muscle reapproximated with 0-vicryl. The subfascial layer made hemostatic with electrocautery. Fascia closed with #1-vicryl running. Subcutaneous tissue irrigated, noted to be hemostatic and reapproximated with 3-0 vicryl. Skin then closed with 4-0 monocryl in a subcuticular fashion. All sponge, lap, instrument, and needle counts correct times two. The patient was frog legged and bimanual exam was done to remove clot (accounted for in EBL) from vagina and better palpate that the uterus had remained firm. The patient tolerated the procedure well and went back to her room in satisfactory condition. Infant was with the mother.

## 2019-12-26 PROCEDURE — 65270000029 HC RM PRIVATE

## 2019-12-26 PROCEDURE — 74011250637 HC RX REV CODE- 250/637: Performed by: OBSTETRICS & GYNECOLOGY

## 2019-12-26 RX ADMIN — IBUPROFEN 800 MG: 800 TABLET, FILM COATED ORAL at 14:29

## 2019-12-26 RX ADMIN — DOCUSATE SODIUM 100 MG: 100 CAPSULE, LIQUID FILLED ORAL at 18:01

## 2019-12-26 RX ADMIN — ACETAMINOPHEN 650 MG: 325 TABLET, FILM COATED ORAL at 04:29

## 2019-12-26 RX ADMIN — ACETAMINOPHEN 650 MG: 325 TABLET, FILM COATED ORAL at 16:17

## 2019-12-26 RX ADMIN — OXYCODONE 5 MG: 5 TABLET ORAL at 16:17

## 2019-12-26 RX ADMIN — DOCUSATE SODIUM 100 MG: 100 CAPSULE, LIQUID FILLED ORAL at 08:40

## 2019-12-26 RX ADMIN — IBUPROFEN 800 MG: 800 TABLET, FILM COATED ORAL at 07:19

## 2019-12-26 RX ADMIN — IBUPROFEN 800 MG: 800 TABLET, FILM COATED ORAL at 00:21

## 2019-12-26 RX ADMIN — SIMETHICONE CHEW TAB 80 MG 80 MG: 80 TABLET ORAL at 08:40

## 2019-12-26 RX ADMIN — IBUPROFEN 800 MG: 800 TABLET, FILM COATED ORAL at 21:20

## 2019-12-26 RX ADMIN — SIMETHICONE CHEW TAB 80 MG 80 MG: 80 TABLET ORAL at 14:29

## 2019-12-26 RX ADMIN — SIMETHICONE CHEW TAB 80 MG 80 MG: 80 TABLET ORAL at 21:20

## 2019-12-26 RX ADMIN — SIMETHICONE CHEW TAB 80 MG 80 MG: 80 TABLET ORAL at 18:01

## 2019-12-26 NOTE — PROGRESS NOTES
Chart reviewed- first time parent.  met with family and provided education on [facility] Postpartum  Home Visit Program.  Family declined referral for home visit.  provided informational packet on  mood disorder education/resources. Patient does not have a PCP - list of PCPs provided. Family receptive to receiving information and denied any additional needs from . Family has 's contact information should any needs/questions arise.     LES Evans-ORLIN  [facility]   [phone]

## 2019-12-26 NOTE — LACTATION NOTE
This note was copied from a baby's chart. Mom reports feedings have been improving. Baby will nurse on both sides. Assisted with breastfeeding in laid back on L. Baby fed fair. Struggled initially to stay on, but once latched did fairly well. Came off and struggled a bit after ~7 minutes, but was again able to re-latch. Demonstrated manual lip flange. Encouraged frequent feeding and watch output. Mom to call out today as needed. Has a single electric Evenflo pump in room. Offered to brenda, mom declined, may return for a double. Noted Symphony pump in room. Mom pumping if baby has short feedings or not latching.

## 2019-12-26 NOTE — LACTATION NOTE
Infant almost 25 hr old and does not consistently latch and feed for at least 15-20 on each breast with each attempt. At [address] [place] [address], infant fed on one side for 10 minutes per mother. Offered to assist mother with pumping and explained importance of pumping if infant does not latch and rhythmically suck for at least 15-20 minutes on each side. Mother agreed to pump. Set mother up on pump and assisted with pumping. After 15 minutes, mother obtained 0.2 ml of colostrum. Infant sleeping now, colostrum put on counter to give at next feeding attempt. Mother to call out with help.

## 2019-12-26 NOTE — PROGRESS NOTES
Post-Operative Day Number 1 and 1/2 Progress Note    Patient doing well post-op day 1 and 1/2 from  delivery without significant complaints. Pain controlled on current medication. Voiding without difficulty, normal lochia. Vitals:  Blood pressure 129/83, pulse 99, temperature 98 °F (36.7 °C), resp. rate 18, height 5' 1\" (1.549 m), last menstrual period 2019, SpO2 96 %, currently breastfeeding. Vital signs stable, afebrile. Exam:  Patient without distress. Abdomen soft, fundus firm at level of umbilicus, nontender. Incision dry and                      clean without erythema. Lower extremities are negative for swelling, cords or tenderness. Labs: none    Assessment and Plan:  Patient appears to be having uncomplicated post- course. Continue routine post-op care and maternal education.

## 2019-12-26 NOTE — LACTATION NOTE

## 2019-12-26 NOTE — PROGRESS NOTES
Initial visit to assess pt's spiritual needs. Feeling today?  good    Receiving good care?  yes    Needs from Spiritual Care: prayers & blessings     Ministry of presence & prayer to demonstrate caring & concern, convey emotional & spiritual support.     pelon Dixon, MDiv,[facility],PhD

## 2019-12-26 NOTE — PROGRESS NOTES
12/26/19 5740   Pain Assessment   Pain Scale 1 Numeric (0 - 10)   Pain Intensity 1 7   Pain Location 1 Abdomen; Incisional   Pain Description 1 Aching; Sore   Pain Intervention(s) 1 Medication (see MAR)   Given one oxy per request

## 2019-12-27 VITALS
DIASTOLIC BLOOD PRESSURE: 74 MMHG | BODY MASS INDEX: 49.32 KG/M2 | OXYGEN SATURATION: 97 % | TEMPERATURE: 98.1 F | SYSTOLIC BLOOD PRESSURE: 130 MMHG | HEIGHT: 61 IN | HEART RATE: 83 BPM | RESPIRATION RATE: 18 BRPM

## 2019-12-27 PROCEDURE — 74011250637 HC RX REV CODE- 250/637: Performed by: OBSTETRICS & GYNECOLOGY

## 2019-12-27 PROCEDURE — 90707 MMR VACCINE SC: CPT | Performed by: OBSTETRICS & GYNECOLOGY

## 2019-12-27 PROCEDURE — 74011250636 HC RX REV CODE- 250/636: Performed by: OBSTETRICS & GYNECOLOGY

## 2019-12-27 RX ORDER — IBUPROFEN 800 MG/1
800 TABLET ORAL
Qty: 30 TAB | Refills: 1 | Status: SHIPPED | OUTPATIENT
Start: 2019-12-27 | End: 2020-02-05

## 2019-12-27 RX ORDER — OXYCODONE HYDROCHLORIDE 5 MG/1
5 TABLET ORAL
Qty: 25 TAB | Refills: 0 | Status: SHIPPED | OUTPATIENT
Start: 2019-12-27 | End: 2020-01-01

## 2019-12-27 RX ADMIN — IBUPROFEN 800 MG: 800 TABLET, FILM COATED ORAL at 04:03

## 2019-12-27 RX ADMIN — ACETAMINOPHEN 650 MG: 325 TABLET, FILM COATED ORAL at 09:11

## 2019-12-27 RX ADMIN — IBUPROFEN 800 MG: 800 TABLET, FILM COATED ORAL at 11:14

## 2019-12-27 RX ADMIN — DOCUSATE SODIUM 100 MG: 100 CAPSULE, LIQUID FILLED ORAL at 09:11

## 2019-12-27 RX ADMIN — SIMETHICONE CHEW TAB 80 MG 80 MG: 80 TABLET ORAL at 09:11

## 2019-12-27 RX ADMIN — OXYCODONE 5 MG: 5 TABLET ORAL at 02:01

## 2019-12-27 RX ADMIN — ACETAMINOPHEN 650 MG: 325 TABLET, FILM COATED ORAL at 02:01

## 2019-12-27 RX ADMIN — MEASLES, MUMPS, AND RUBELLA VIRUS VACCINE LIVE 0.5 ML: 1000; 12500; 1000 INJECTION, POWDER, LYOPHILIZED, FOR SUSPENSION SUBCUTANEOUS at 09:12

## 2019-12-27 RX ADMIN — SIMETHICONE CHEW TAB 80 MG 80 MG: 80 TABLET ORAL at 11:14

## 2019-12-27 NOTE — PROGRESS NOTES
Discharge instructions completed. Patient voiced understanding. Prescription given.   Patient discharged home via wheelchair

## 2019-12-27 NOTE — LACTATION NOTE
This note was copied from a baby's chart. Individualized Feeding Plan for Breastfeeding   Lactation Services [phone]      As much as possible, hold your baby on your chest so babys bare skin is against your bare skin with a blanket covering babys back, especially 30 minutes before it is time for baby to eat. Watch for early feeding cues such as, licking lips, sucking motions, rooting, hands to mouth. Crying is a late feeding cue. Feed your baby at least 8 times in 24 hours, or more if your baby is showing feeding cues. If baby is sleepy put baby skin to skin and watch for hunger cues. To rouse baby: unwrap, undress, massage hands, feet, & back, change diaper, gently change babys position from lying to sitting. 15-20 minutes on the first breast of active breastfeeding is considered a good feeding. Good, active breastfeeding is when baby is alert, tugging the nipple, their ear may move, and you can hear swallows. Allow baby to finish the first side before changing sides. Sleeping at the breast or only brief, light sucks should not be considered a good, full breastfeed. At each feeding:  __x__1. Do Suck Practice on finger before each feeding until sucking pattern is smooth. Try using index finger. Nail down towards tongue. __x__2. Hand Express for a few minutes prior to latching to help start milk flow. __x__3. Baby needs to NURSE WELL x 15-20 minutes on at least first breast, burp and offer 2nd breast at every feeding. If no sustained latch only attempt at breast for 10 minutes. Would recommend pumping if baby only latches to one breast at a feeding. If baby does NOT latch on and feed well on at least one side, you should:   __x__4. Double pump for 15 minutes with breast massage and compression. Hand express for an additional 2-3 minutes per side. Pump after each feeding attempt or poor feeding, up to 8 times per day.  If you are not putting baby to the breast you need to pump 8 times a day. Pump every 3 hours. __x__5. Give baby all of the breast milk you obtain using a straight syringe or  curved syringe or bottle. Can supplement formula also if needed. If baby does NOT have enough wet and dirty diapers per day, is jaundiced/lethargic, or has significant weight loss AND you do NOT pump enough milk for each feeding (per volume listed below), formula supplementation may need to be used. Call lactation department /pediatrician if you have concerns. AVERAGE INTAKES OF COLOSTRUM BY HEALTHY  INFANTS:  Time  Day Intake (ml/feed)  Based on 8 feedings per day. 1st 24 hrs  1 2-10 ml  24-48 hrs  2 5-15 ml  48-72 hrs  3 15-30 ml (0.5-1 oz) Amount listed is per feeding  72-96 hrs  4 30-45 ml (1-1.5oz)                          5-6       45-60 ml (1.5-2oz)                           7          60-75ml (2-2.5oz)    By day 7, baby will need 60-75 ml or 2-2.5 oz at each feeding based on 8 feedings per day & babys weight. (1oz = 30ml). Total milk volume needed in 24 hours by Day 7 is 17-19 oz per day based on baby's birthweight of 7-4. The more often baby eats, the less volume they need per feeding. If baby is eating more often than the minimum of 8 times per day, they may take less per feeding. Comments: Keep breastfeeding and/or pumping at all feeds to stimulate milk supply. If baby still fussy, can supplement formula after breastfeeding or pumping if desired.

## 2019-12-27 NOTE — DISCHARGE SUMMARY
Obstetrical Discharge Summary     Name: Zeeshan Tolbert MRN: [id number]  SSN: [id number]    YOB: 1990  Age: 30 y.o. Sex: female      Allergies: Patient has no known allergies. Admit Date: 2019    Discharge Date: 2019     Admitting Physician: Valentin Gonzalez MD     Attending Physician:  Jose Marquez MD     * Admission Diagnoses: 39 weeks gestation of pregnancy [Z3A.39];HTN in pregnancy, chronic [O10.919]; Encounter for planned induction of labor [Z34.90]    * Discharge Diagnoses:   Information for the patient's :  Iraida Huber [[id number]]   Delivery of a 7 lb 4.1 oz (3.29 kg) male infant via , Low Transverse on 2019 at 11:46 PM  by Valentin Gonzalez. Apgars were 9  and 9 .        Additional Diagnoses:   Hospital Problems as of 2019 Date Reviewed: 2019          Codes Class Noted - Resolved POA    * (Principal) HTN in pregnancy, chronic ICD-10-CM: O10.919  ICD-9-CM: 642.00  2019 - Present Unknown        39 weeks gestation of pregnancy ICD-10-CM: Z3A.39  ICD-9-CM: V22.2  2019 - Present Unknown        Encounter for planned induction of labor ICD-10-CM: Z34.90  ICD-9-CM: V22.1  2019 - Present Unknown             Lab Results   Component Value Date/Time    ABO/Rh(D) O POSITIVE 2019 06:34 PM    Rubella, External non immune 2019    GrBStrep, External Positive 2019    ABO,Rh O positive 2019      Immunization History   Administered Date(s) Administered    Influenza Vaccine 10/08/2019    MMR 2019    Tdap 2019       * Procedures:   Procedure(s):   SECTION      [place]  Depression Scale  I have been able to laugh and see the funny side of things: As much as I always could  I have looked forward with enjoyment to things: As much as I ever did  I have blamed myself unnecessarily when things went wrong: No, never  I have been anxious or worried for no good reason: No, not at all  I have felt scared or panicky for no very good reason: No, not at all  Things have been getting on top of me: No, most of the time I have coped quite well  I have been so unhappy that I have had difficulty sleeping: No, not at all  I have felt sad or miserable: No, not at all  I have been so unhappy that I have been crying: No, never  The thought of harming myself has occurred to me: Never  Total Score: 1    * Discharge Condition: good    * Hospital Course: Normal hospital course following the delivery. Post op day 1 hg was 9.8. * Disposition: Home    Discharge Medications:   Current Discharge Medication List      START taking these medications    Details   ibuprofen (MOTRIN) 800 mg tablet Take 1 Tab by mouth every eight (8) hours as needed for Pain. Qty: 30 Tab, Refills: 1      oxyCODONE IR (ROXICODONE) 5 mg immediate release tablet Take 1 Tab by mouth every four (4) hours as needed for Pain for up to 5 days. Max Daily Amount: 30 mg.  Qty: 25 Tab, Refills: 0    Associated Diagnoses: HTN in pregnancy, chronic         CONTINUE these medications which have NOT CHANGED    Details   ferrous sulfate ER (SLOW RELEASE IRON) 160 mg (50 mg iron) TbER tablet Take 1 Tab by mouth every Tuesday and Friday. prenatal 47/iron/folate 1/dha (PNV-DHA PO) Take  by mouth. STOP taking these medications       calcium carbonate (TUMS) 200 mg calcium (500 mg) chew Comments:   Reason for Stopping:         calcium carbonate (CALCIUM 500 PO) Comments:   Reason for Stopping:         cholecalciferol (VITAMIN D3) 2,000 unit cap capsule Comments:   Reason for Stopping:         ondansetron (ZOFRAN ODT) 4 mg disintegrating tablet Comments:   Reason for Stopping:               * Follow-up Care/Patient Instructions:   Activity: No driving for 2 weeks  Diet: Regular Diet  Wound Care: As directed    Follow-up Information     Follow up With Specialties Details Why Haley Crockett MD Obstetrics & Gynecology, Gynecology, Obstetrics Schedule an appointment as soon as possible for a visit in 2 weeks  [address] [facility] [place]  [phone]      None    None ([phone]) Patient stated that they have no PCP

## 2019-12-27 NOTE — PROGRESS NOTES
Post-Partum Day Number 3 Progress Note    Patient doing well  without significant complaints. Pain controlled on current medication. Voiding without difficulty, normal lochia. Vitals:    Visit Vitals  /72 (BP 1 Location: Left arm, BP Patient Position: At rest)   Pulse 88   Temp 98 °F (36.7 °C)   Resp 18   Ht 5' 1\" (1.549 m)   LMP 03/18/2019   SpO2 97%   Breastfeeding Yes   BMI 49.32 kg/m²       Vital signs stable, afebrile. Exam:  Patient without distress. Heart rrr  Lungs cta b&s               Abdomen soft, fundus firm at level of umbilicus, nontender. Incision clean, dry and intact. Lower extremities are negative for cords or tenderness. +1 to +2 lower extremity edema. Lab Results   Component Value Date/Time    ABO Group O 06/13/2019 12:00 PM    Rh (D) Positive 06/13/2019 12:00 PM    ABO/Rh(D) Derrick Lime POSITIVE 12/23/2019 06:34 PM        Lab Results   Component Value Date/Time    ABO/Rh(D) Derrick Lime POSITIVE 12/23/2019 06:34 PM    Antibody screen NEG 12/23/2019 06:34 PM    Antibody screen, External negative 06/13/2019    Rubella, External non immune 06/13/2019    GrBStrep, External Positive 12/03/2019    ABO,Rh O positive 06/13/2019     Lab Results   Component Value Date/Time    HGB 9.8 (L) 12/25/2019 06:03 AM    Hgb, External 12.2 06/13/2019         Assessment and Plan:  Patient appears to be having uncomplicated post-partum course. Continue routine post-delivery care and maternal education. Breastfeeding. Acute on chronic anemia. Iron rich diet. Will discharge home.   Iron twice weekly

## 2019-12-27 NOTE — DISCHARGE INSTRUCTIONS
Patient Education         Section: What to Expect at Home  Your Recovery    A  section, or , is surgery to deliver your baby through a cut, called an incision, that the doctor makes in your lower belly and uterus. You may have some pain in your lower belly and need pain medicine for 1 to 2 weeks. You can expect some vaginal bleeding for several weeks. You will probably need about 6 weeks to fully recover. It is important to take it easy while the incision is healing. Avoid heavy lifting, strenuous activities, or exercises that strain the belly muscles while you are recovering. Ask a family member or friend for help with housework, cooking, and shopping. This care sheet gives you a general idea about how long it will take for you to recover. But each person recovers at a different pace. Follow the steps below to get better as quickly as possible. How can you care for yourself at home? Activity    · Rest when you feel tired. Getting enough sleep will help you recover.     · Try to walk each day. Start by walking a little more than you did the day before. Bit by bit, increase the amount you walk. Walking boosts blood flow and helps prevent pneumonia, constipation, and blood clots.     · Avoid strenuous activities, such as bicycle riding, jogging, weightlifting, and aerobic exercise, for 6 weeks or until your doctor says it is okay.     · Until your doctor says it is okay, do not lift anything heavier than your baby.     · Do not do sit-ups or other exercises that strain the belly muscles for 6 weeks or until your doctor says it is okay.     · Hold a pillow over your incision when you cough or take deep breaths. This will support your belly and decrease your pain.     · You may shower as usual. Pat the incision dry when you are done.     · You will have some vaginal bleeding. Wear sanitary pads.  Do not douche or use tampons until your doctor says it is okay.     · Ask your doctor when you can drive again.     · You will probably need to take at least 6 weeks off work. It depends on the type of work you do and how you feel.     · Ask your doctor when it is okay for you to have sex. Diet    · You can eat your normal diet. If your stomach is upset, try bland, low-fat foods like plain rice, broiled chicken, toast, and yogurt.     · Drink plenty of fluids (unless your doctor tells you not to).     · You may notice that your bowel movements are not regular right after your surgery. This is common. Try to avoid constipation and straining with bowel movements. You may want to take a fiber supplement every day. If you have not had a bowel movement after a couple of days, ask your doctor about taking a mild laxative.     · If you are breastfeeding, limit alcohol. Alcohol can cause a lack of energy and other health problems for the baby when a breastfeeding woman drinks heavily. It can also get in the way of a mom's ability to feed her baby or to care for the child in other ways. There isn't a lot of research about exactly how much alcohol can harm a baby. Having no alcohol is the safest choice for your baby. If you choose to have a drink now and then, have only one drink, and limit the number of occasions that you have a drink. Wait to breastfeed at least 2 hours after you have a drink to reduce the amount of alcohol the baby may get in the milk. Medicines    · Your doctor will tell you if and when you can restart your medicines. He or she will also give you instructions about taking any new medicines.     · If you take blood thinners, such as warfarin (Coumadin), clopidogrel (Plavix), or aspirin, be sure to talk to your doctor. He or she will tell you if and when to start taking those medicines again. Make sure that you understand exactly what your doctor wants you to do.     · Take pain medicines exactly as directed. ? If the doctor gave you a prescription medicine for pain, take it as prescribed. ?  If you are not taking a prescription pain medicine, ask your doctor if you can take an over-the-counter medicine.     · If you think your pain medicine is making you sick to your stomach:  ? Take your medicine after meals (unless your doctor has told you not to). ? Ask your doctor for a different pain medicine.     · If your doctor prescribed antibiotics, take them as directed. Do not stop taking them just because you feel better. You need to take the full course of antibiotics. Incision care    · If you have strips of tape on the incision, leave the tape on for a week or until it falls off.     · Wash the area daily with warm, soapy water, and pat it dry. Don't use hydrogen peroxide or alcohol, which can slow healing. You may cover the area with a gauze bandage if it weeps or rubs against clothing. Change the bandage every day.     · Keep the area clean and dry. Other instructions    · If you breastfeed your baby, you may be more comfortable while you are healing if you place the baby so that he or she is not resting on your belly. Try tucking your baby under your arm, with his or her body along the side you will be feeding on. Support your baby's upper body with your arm. With that hand you can control your baby's head to bring his or her mouth to your breast. This is sometimes called the football hold. Follow-up care is a key part of your treatment and safety. Be sure to make and go to all appointments, and call your doctor if you are having problems. It's also a good idea to know your test results and keep a list of the medicines you take. When should you call for help? Call 911 anytime you think you may need emergency care.  For example, call if:    · You have thoughts of harming yourself, your baby, or another person.     · You passed out (lost consciousness).     · You have chest pain, are short of breath, or cough up blood.     · You have a seizure.    Call your doctor now or seek immediate medical care if:    · You have pain that does not get better after you take pain medicine.     · You have severe vaginal bleeding.     · You are dizzy or lightheaded, or you feel like you may faint.     · You have new or worse pain in your belly or pelvis.     · You have loose stitches, or your incision comes open.     · You have symptoms of infection, such as:  ? Increased pain, swelling, warmth, or redness. ? Red streaks leading from the incision. ? Pus draining from the incision. ? A fever.     · You have symptoms of a blood clot in your leg (called a deep vein thrombosis), such as:  ? Pain in your calf, back of the knee, thigh, or groin. ? Redness and swelling in your leg or groin.     · You have signs of preeclampsia, such as:  ? Sudden swelling of your face, hands, or feet. ? New vision problems (such as dimness, blurring, or seeing spots). ? A severe headache.    Watch closely for changes in your health, and be sure to contact your doctor if:    · You do not get better as expected. Where can you learn more? Go to http://mateo-jimmy.info/. Enter M806 in the search box to learn more about \" Section: What to Expect at Home. \"  Current as of: May 29, 2019  Content Version: 12.2  © 6760-3757 [employer]. Care instructions adapted under license by [employer] (which disclaims liability or warranty for this information). If you have questions about a medical condition or this instruction, always ask your healthcare professional. [place] [address] any warranty or liability for your use of this information.

## 2019-12-28 NOTE — LACTATION NOTE
Lactation visit. Mom states baby did not feed well overnight. Inconsistently latching. Did pump a few times but only drops returned. Baby now at 9% weight loss, did have stool this morning but had been over 24 hours since previous stool. Mom anxious. No medical need to supplement at this time but mom can supplement as desired. Reviewed supply and demand. Continue to breastfeed on demand. Always breastfeed first at all feeds. If baby feeds poorly or has short feed, would recommend pumping and feeding back pumped milk. If supplementing formula routinely, would also recommend pumping then. Mom agreeable. No pump at home yet, one on order via insurance. Mom will rent hospital grade pump until her personal pump arrives this week hopefully. All questions answered. Feeding plan given and reviewed, has copy for home.

## 2019-12-28 NOTE — LACTATION NOTE
Mom and baby are going home today. Continue to offer the breast without restriction. Mom's milk should be fully in over the next few days. Reviewed engorgement precautions. Hand Expression has been demoed and written hand-out reviewed. As milk comes in baby will be more alert at the breast and swallows will be more obvious. Breasts may feel softer once baby has finished nursing. Baby should be back to birth weight by 3weeks of age. And then gain on average 1 oz per day for the next 2-3 months. Reviewed babies should be exclusively breastfeeding for the first 6 months and that breastfeeding should continue after introduction of appropriate complimentary foods after 6 months. Initial output should be at least 1 wet and 1 bowel movement for each day old baby is. By day 5-7 once milk is fully in baby will consistently have 6 or more soaking wet diapers and about 4 bowel movement. Some babies have a bowel movement with every feeding and some have 1-3 large bowel movements each day. Inadequate output may indicate inadequate feedings and should be reported to your Pediatrician. Bowel habits may change as baby gets older. Encouraged follow-up at Pediatrician in 1-2 days, no later than 1 week of age. Call [facility] for any questions as needed or to set up an OP visit. OP phone calls are returned within 24 hours. Community Breastfeeding Resource List given.

## 2020-01-08 PROBLEM — E66.01 OBESITY, MORBID (HCC): Status: ACTIVE | Noted: 2020-01-08

## 2022-03-18 PROBLEM — Z23 ENCOUNTER FOR IMMUNIZATION: Status: ACTIVE | Noted: 2019-08-28

## 2022-03-19 PROBLEM — O99.213 OBESITY AFFECTING PREGNANCY IN THIRD TRIMESTER: Status: ACTIVE | Noted: 2019-06-13

## 2022-03-19 PROBLEM — O09.899 RUBELLA NON-IMMUNE STATUS, ANTEPARTUM: Status: ACTIVE | Noted: 2019-06-17

## 2022-03-19 PROBLEM — Z34.90 ENCOUNTER FOR PLANNED INDUCTION OF LABOR: Status: ACTIVE | Noted: 2019-12-23

## 2022-03-19 PROBLEM — Z3A.39 39 WEEKS GESTATION OF PREGNANCY: Status: ACTIVE | Noted: 2019-12-23

## 2022-03-19 PROBLEM — Z28.39 RUBELLA NON-IMMUNE STATUS, ANTEPARTUM: Status: ACTIVE | Noted: 2019-06-17

## 2022-03-19 PROBLEM — O10.919 HTN IN PREGNANCY, CHRONIC: Status: ACTIVE | Noted: 2019-12-23

## 2022-03-19 PROBLEM — B95.1 POSITIVE GBS TEST: Status: ACTIVE | Noted: 2019-12-06

## 2022-03-19 PROBLEM — E66.01 OBESITY, MORBID (HCC): Status: ACTIVE | Noted: 2020-01-08

## 2022-03-20 PROBLEM — O09.93 HIGH-RISK PREGNANCY IN THIRD TRIMESTER: Status: ACTIVE | Noted: 2019-06-13

## 2022-03-20 PROBLEM — O16.3 ELEVATED BLOOD PRESSURE AFFECTING PREGNANCY IN THIRD TRIMESTER, ANTEPARTUM: Status: ACTIVE | Noted: 2019-09-26

## 2023-08-28 ENCOUNTER — OFFICE VISIT (OUTPATIENT)
Dept: OBGYN CLINIC | Age: 33
End: 2023-08-28
Payer: COMMERCIAL

## 2023-08-28 VITALS — DIASTOLIC BLOOD PRESSURE: 72 MMHG | WEIGHT: 241.4 LBS | SYSTOLIC BLOOD PRESSURE: 124 MMHG

## 2023-08-28 DIAGNOSIS — Z01.419 WELL WOMAN EXAM: Primary | ICD-10-CM

## 2023-08-28 DIAGNOSIS — N92.6 MISSED MENSES: ICD-10-CM

## 2023-08-28 DIAGNOSIS — Z87.42 HISTORY OF AMENORRHEA: ICD-10-CM

## 2023-08-28 DIAGNOSIS — Z11.51 SCREENING FOR HPV (HUMAN PAPILLOMAVIRUS): ICD-10-CM

## 2023-08-28 DIAGNOSIS — Z13.89 SCREENING FOR GENITOURINARY CONDITION: ICD-10-CM

## 2023-08-28 DIAGNOSIS — L70.9 ACNE, UNSPECIFIED ACNE TYPE: ICD-10-CM

## 2023-08-28 DIAGNOSIS — Z12.4 SCREENING FOR CERVICAL CANCER: ICD-10-CM

## 2023-08-28 LAB
BILIRUBIN, URINE, POC: NEGATIVE
BLOOD URINE, POC: NEGATIVE
ESTRADIOL SERPL-MCNC: 67.43 PG/ML
FSH SERPL-ACNC: 5.4 MIU/ML
GLUCOSE URINE, POC: NEGATIVE
KETONES, URINE, POC: NEGATIVE
LEUKOCYTE ESTERASE, URINE, POC: NORMAL
LH SERPL-ACNC: 6 MIU/ML
NITRITE, URINE, POC: NEGATIVE
PH, URINE, POC: 6 (ref 4.6–8)
PROLACTIN SERPL-MCNC: 11.3 NG/ML
PROTEIN,URINE, POC: NEGATIVE
SPECIFIC GRAVITY, URINE, POC: 1.01 (ref 1–1.03)
URINALYSIS CLARITY, POC: CLEAR
URINALYSIS COLOR, POC: YELLOW
UROBILINOGEN, POC: NORMAL

## 2023-08-28 PROCEDURE — 99385 PREV VISIT NEW AGE 18-39: CPT | Performed by: NURSE PRACTITIONER

## 2023-08-28 PROCEDURE — 81003 URINALYSIS AUTO W/O SCOPE: CPT | Performed by: NURSE PRACTITIONER

## 2023-08-28 NOTE — PROGRESS NOTES
Patient presents today for a routine gynecological examination. States she and he  are considering TTC in the next 1 year and would like to discuss her periods. She is currently 3mo late on cycle and notes previous history of amenorrhea as well. Notes 3mo is the longest she has gone between cycles, but it's not uncommon for her to miss 1-2. Denies h/o anovulatory bleeding. Pos hirsutism. Pos acne. Denies known h/o PCOS. Did not have trouble conceiving first child. Has taken home preg tests and all neg. OB History          1    Para   1    Term   1            AB        Living   1         SAB        IAB        Ectopic        Molar        Multiple        Live Births   1                  GYN History   Last Pap Smear: 19  Pap: Negative  HPV: was not met. CT/NG: Negative        Patient's last menstrual period was 2023 (exact date). Cycle Length 28-90  Lasting 5-7  positive dysmenorrhea;   negative postcoital bleeding    Past Medical History:  Past Medical History:   Diagnosis Date    Epilepsy ([address])     as a child - nothing since    History of seizures as a child 2019 at [facility]: Was followed by neuro and treated with Keppra, none since. HTN in pregnancy, chronic 2019       Past Surgical History:  Past Surgical History:   Procedure Laterality Date     SECTION  2019    Male    ORTHOPEDIC SURGERY Right     Right arm (plate and 4 screws due to broken arm)    TONSILLECTOMY AND ADENOIDECTOMY         Allergies:   No Known Allergies    Medication History:  No current outpatient medications on file. No current facility-administered medications for this visit.        Social History:  Social History     Socioeconomic History    Marital status:      Spouse name: Not on file    Number of children: Not on file    Years of education: Not on file    Highest education level: Not on file   Occupational History    Not on file   Tobacco Use    Smoking

## 2023-08-29 LAB
25(OH)D3 SERPL-MCNC: 30.5 NG/ML (ref 30–100)
EST. AVERAGE GLUCOSE BLD GHB EST-MCNC: 100 MG/DL
HBA1C MFR BLD: 5.1 % (ref 4.8–5.6)
HCG SERPL-ACNC: <1 MIU/ML (ref 0–6)
T4 FREE SERPL-MCNC: 1.2 NG/DL (ref 0.78–1.46)
TSH, 3RD GENERATION: 1.54 UIU/ML (ref 0.36–3.74)

## 2023-08-30 LAB — INSULIN SERPL-ACNC: 23 UIU/ML (ref 2.6–24.9)

## 2023-08-31 LAB — TESTOST SERPL-MCNC: 32 NG/DL (ref 10–55)

## 2023-09-02 LAB — MIS SERPL-MCNC: 7.51 NG/ML

## 2023-09-05 LAB
CYTOLOGIST CVX/VAG CYTO: NORMAL
CYTOLOGY CVX/VAG DOC THIN PREP: NORMAL
HPV APTIMA: NEGATIVE
Lab: NORMAL
Lab: NORMAL
PATH REPORT.FINAL DX SPEC: NORMAL
STAT OF ADQ CVX/VAG CYTO-IMP: NORMAL
TESTOST FREE SERPL-MCNC: 2.8 PG/ML (ref 0–4.2)
TESTOST SERPL-MCNC: 32 NG/DL (ref 10–55)

## 2023-09-07 ENCOUNTER — TELEPHONE (OUTPATIENT)
Dept: OBGYN CLINIC | Age: 33
End: 2023-09-07

## 2023-09-07 RX ORDER — ERGOCALCIFEROL 1.25 MG/1
50000 CAPSULE ORAL WEEKLY
Qty: 8 CAPSULE | Refills: 0 | Status: SHIPPED | OUTPATIENT
Start: 2023-09-07

## 2023-09-07 NOTE — TELEPHONE ENCOUNTER
----- Message from PINEDA Cardona - CNP sent at 9/5/2023  8:08 AM EDT -----  [facility] suggestive of PCOS- offer US  Vit d also a little low  50,000IU weekly x8 weeks then 2000IU daily

## 2023-09-07 NOTE — TELEPHONE ENCOUNTER
[employer] message sent. Prescription sent to pharmacy.     Orders Placed This Encounter    vitamin D (ERGOCALCIFEROL) 1.25 MG (84814 UT) CAPS capsule     Sig: Take 1 capsule by mouth once a week     Dispense:  8 capsule     Refill:  0

## 2023-09-18 ENCOUNTER — OFFICE VISIT (OUTPATIENT)
Dept: OBGYN CLINIC | Age: 33
End: 2023-09-18
Payer: COMMERCIAL

## 2023-09-18 ENCOUNTER — PROCEDURE VISIT (OUTPATIENT)
Dept: OBGYN CLINIC | Age: 33
End: 2023-09-18
Payer: COMMERCIAL

## 2023-09-18 VITALS
HEIGHT: 61 IN | SYSTOLIC BLOOD PRESSURE: 128 MMHG | BODY MASS INDEX: 45.41 KG/M2 | DIASTOLIC BLOOD PRESSURE: 82 MMHG | WEIGHT: 240.5 LBS

## 2023-09-18 DIAGNOSIS — N92.6 MISSED MENSES: ICD-10-CM

## 2023-09-18 DIAGNOSIS — Z87.42 HISTORY OF AMENORRHEA: Primary | ICD-10-CM

## 2023-09-18 DIAGNOSIS — L70.9 ACNE, UNSPECIFIED ACNE TYPE: ICD-10-CM

## 2023-09-18 DIAGNOSIS — E28.2 PCOS (POLYCYSTIC OVARIAN SYNDROME): ICD-10-CM

## 2023-09-18 DIAGNOSIS — L68.0 HIRSUTISM: ICD-10-CM

## 2023-09-18 DIAGNOSIS — Z13.89 SCREENING FOR GENITOURINARY CONDITION: ICD-10-CM

## 2023-09-18 DIAGNOSIS — N91.2 AMENORRHEA: Primary | ICD-10-CM

## 2023-09-18 PROCEDURE — 76830 TRANSVAGINAL US NON-OB: CPT | Performed by: OBSTETRICS & GYNECOLOGY

## 2023-09-18 PROCEDURE — 99214 OFFICE O/P EST MOD 30 MIN: CPT | Performed by: NURSE PRACTITIONER

## 2023-09-18 RX ORDER — MEDROXYPROGESTERONE ACETATE 10 MG/1
10 TABLET ORAL DAILY
Qty: 7 TABLET | Refills: 0 | Status: SHIPPED | OUTPATIENT
Start: 2023-09-18 | End: 2023-09-25

## 2023-09-18 NOTE — PROGRESS NOTES
The patient is a 29 y.o. Idelle Eagle who is seen for TVUS to evaluate for PCOS. AMH done last visit suggestive of PCOS. At 24 WWE noted she and her  are considering TTC in the next 1 year and wanted to discuss her periods. She was 3mo late on cycle at  visit and noted previous history of amenorrhea as well. Noted 3mo is the longest she has gone between cycles, but it's not uncommon for her to miss 1-2 at a time. Denied h/o anovulatory bleeding. Pos hirsutism. Pos acne. Denied known h/o PCOS. Did not have trouble conceiving first child. Has taken home preg tests and all neg. TV GYN US performed:   Uterus is anteverted and heterogeneous - possible diffuse adeno  Endo= 10.8mm. No intracavitary mass visualized. ROV Vol= 9cm cubed, borderline PCOS appearance. LOV not visualized TA/TV. No free fluid. HISTORY:      Patient's last menstrual period was 2023 (exact date). Sexual History:  has sex with males  Contraception:  none  Current Outpatient Medications on File Prior to Visit   Medication Sig Dispense Refill    vitamin D (ERGOCALCIFEROL) 1.25 MG (24003 UT) CAPS capsule Take 1 capsule by mouth once a week 8 capsule 0     No current facility-administered medications on file prior to visit. ROS:  Feeling well. No dyspnea or chest pain on exertion. No abdominal pain, change in bowel habits, black or bloody stools. No urinary tract symptoms. GYN ROS: see above. PHYSICAL EXAM:  Blood pressure 128/82, height 5' 1\" (1.549 m), weight 240 lb 8 oz (109.1 kg), last menstrual period 2023. The patient appears well, alert, oriented x 3, in no distress. ASSESSMENT:  Encounter Diagnoses   Name Primary?     Amenorrhea Yes    Hirsutism     Acne, unspecified acne type     PCOS (polycystic ovarian syndrome)        PLAN:  All questions answered  US reviewed with pt and c/w PCOS/stripe within normal limits in regards to thickness at 10mm and a bit heterogeneous; left ovary

## 2023-09-27 RX ORDER — ERGOCALCIFEROL 1.25 MG/1
50000 CAPSULE ORAL WEEKLY
Qty: 4 CAPSULE | Refills: 1 | OUTPATIENT
Start: 2023-09-27

## 2023-10-20 ENCOUNTER — NURSE ONLY (OUTPATIENT)
Dept: OBGYN CLINIC | Age: 33
End: 2023-10-20

## 2023-10-20 DIAGNOSIS — E28.2 PCOS (POLYCYSTIC OVARIAN SYNDROME): ICD-10-CM

## 2023-10-20 DIAGNOSIS — N91.2 AMENORRHEA: ICD-10-CM

## 2023-10-20 LAB — PROGEST SERPL-MCNC: 0.36 NG/ML

## 2023-10-23 ENCOUNTER — TELEPHONE (OUTPATIENT)
Dept: OBGYN CLINIC | Age: 33
End: 2023-10-23

## 2023-10-23 DIAGNOSIS — N97.0 ANOVULATION: Primary | ICD-10-CM

## 2023-10-23 NOTE — TELEPHONE ENCOUNTER
----- Message from PINEDA Almeida - CNP sent at 10/23/2023  7:50 AM EDT -----  Progesterone low-not c/w ovulation-  PREG referral

## 2024-04-19 NOTE — ANESTHESIA PREPROCEDURE EVALUATION
Relevant Problems   No relevant active problems       Anesthetic History               Review of Systems / Medical History  Patient summary reviewed and pertinent labs reviewed    Pulmonary  Within defined limits                 Neuro/Psych     seizures (Distant h/o epilepsy, was treated for a few years but has been of antiepiletics for several years now.)         Cardiovascular    Hypertension              Exercise tolerance: >4 METS  Comments: PIH   GI/Hepatic/Renal  Within defined limits              Endo/Other        Morbid obesity     Other Findings              Physical Exam    Airway  Mallampati: II  TM Distance: 4 - 6 cm  Neck ROM: normal range of motion   Mouth opening: Normal     Cardiovascular    Rhythm: regular           Dental  No notable dental hx       Pulmonary  Breath sounds clear to auscultation               Abdominal         Other Findings            Anesthetic Plan    ASA: 3  Anesthesia type: epidural            Anesthetic plan and risks discussed with: Patient      Anesthesia options for labor analgesia discussed and patient wishes to proceed with DENNIS
No

## 2025-07-15 SDOH — HEALTH STABILITY: PHYSICAL HEALTH: ON AVERAGE, HOW MANY MINUTES DO YOU ENGAGE IN EXERCISE AT THIS LEVEL?: 30 MIN

## 2025-07-15 SDOH — HEALTH STABILITY: PHYSICAL HEALTH: ON AVERAGE, HOW MANY DAYS PER WEEK DO YOU ENGAGE IN MODERATE TO STRENUOUS EXERCISE (LIKE A BRISK WALK)?: 3 DAYS

## 2025-07-16 ENCOUNTER — OFFICE VISIT (OUTPATIENT)
Dept: FAMILY MEDICINE CLINIC | Facility: CLINIC | Age: 35
End: 2025-07-16
Payer: COMMERCIAL

## 2025-07-16 VITALS
BODY MASS INDEX: 45.16 KG/M2 | HEIGHT: 61 IN | WEIGHT: 239.2 LBS | HEART RATE: 88 BPM | SYSTOLIC BLOOD PRESSURE: 149 MMHG | OXYGEN SATURATION: 99 % | TEMPERATURE: 98.7 F | DIASTOLIC BLOOD PRESSURE: 105 MMHG

## 2025-07-16 DIAGNOSIS — I10 HYPERTENSION, UNSPECIFIED TYPE: Primary | ICD-10-CM

## 2025-07-16 DIAGNOSIS — R73.9 ELEVATED RANDOM BLOOD GLUCOSE LEVEL: Chronic | ICD-10-CM

## 2025-07-16 DIAGNOSIS — Z13.220 SCREENING FOR HYPERLIPIDEMIA: ICD-10-CM

## 2025-07-16 DIAGNOSIS — E28.2 PCOS (POLYCYSTIC OVARIAN SYNDROME): ICD-10-CM

## 2025-07-16 DIAGNOSIS — Z79.899 DRUG DOSE CHANGED: ICD-10-CM

## 2025-07-16 DIAGNOSIS — Z76.89 ESTABLISHING CARE WITH NEW DOCTOR, ENCOUNTER FOR: ICD-10-CM

## 2025-07-16 PROBLEM — O09.93 HIGH-RISK PREGNANCY IN THIRD TRIMESTER: Status: RESOLVED | Noted: 2019-06-13 | Resolved: 2025-07-16

## 2025-07-16 PROBLEM — O16.3 ELEVATED BLOOD PRESSURE AFFECTING PREGNANCY IN THIRD TRIMESTER, ANTEPARTUM: Status: RESOLVED | Noted: 2019-09-26 | Resolved: 2025-07-16

## 2025-07-16 PROBLEM — Z34.90 ENCOUNTER FOR PLANNED INDUCTION OF LABOR: Status: RESOLVED | Noted: 2019-12-23 | Resolved: 2025-07-16

## 2025-07-16 PROBLEM — Z23 ENCOUNTER FOR IMMUNIZATION: Status: RESOLVED | Noted: 2019-08-28 | Resolved: 2025-07-16

## 2025-07-16 PROBLEM — O09.899 RUBELLA NON-IMMUNE STATUS, ANTEPARTUM: Status: RESOLVED | Noted: 2019-06-17 | Resolved: 2025-07-16

## 2025-07-16 PROBLEM — O99.213 OBESITY AFFECTING PREGNANCY IN THIRD TRIMESTER: Status: RESOLVED | Noted: 2019-06-13 | Resolved: 2025-07-16

## 2025-07-16 PROBLEM — O10.919 HTN IN PREGNANCY, CHRONIC: Status: RESOLVED | Noted: 2019-12-23 | Resolved: 2025-07-16

## 2025-07-16 PROBLEM — Z28.39 RUBELLA NON-IMMUNE STATUS, ANTEPARTUM: Status: RESOLVED | Noted: 2019-06-17 | Resolved: 2025-07-16

## 2025-07-16 PROBLEM — B95.1 POSITIVE GBS TEST: Status: RESOLVED | Noted: 2019-12-06 | Resolved: 2025-07-16

## 2025-07-16 PROBLEM — Z3A.39 39 WEEKS GESTATION OF PREGNANCY: Status: RESOLVED | Noted: 2019-12-23 | Resolved: 2025-07-16

## 2025-07-16 PROCEDURE — 99204 OFFICE O/P NEW MOD 45 MIN: CPT

## 2025-07-16 PROCEDURE — 3080F DIAST BP >= 90 MM HG: CPT

## 2025-07-16 PROCEDURE — 3077F SYST BP >= 140 MM HG: CPT

## 2025-07-16 RX ORDER — AMLODIPINE BESYLATE 5 MG/1
5 TABLET ORAL DAILY
COMMUNITY
Start: 2025-06-12 | End: 2025-07-16 | Stop reason: DRUGHIGH

## 2025-07-16 RX ORDER — AMLODIPINE BESYLATE 5 MG/1
5 TABLET ORAL DAILY
Qty: 30 TABLET | Refills: 2 | Status: CANCELLED | OUTPATIENT
Start: 2025-07-16 | End: 2025-10-14

## 2025-07-16 RX ORDER — AMLODIPINE BESYLATE 10 MG/1
10 TABLET ORAL DAILY
Qty: 90 TABLET | Refills: 0 | Status: SHIPPED | OUTPATIENT
Start: 2025-07-16

## 2025-07-16 RX ORDER — LETROZOLE 2.5 MG/1
7.5 TABLET, FILM COATED ORAL
COMMUNITY

## 2025-07-16 RX ORDER — CHORIOGONADOTROPIN ALFA 250 UG/.5ML
250 INJECTION, SOLUTION SUBCUTANEOUS
COMMUNITY

## 2025-07-16 RX ORDER — ACETAMINOPHEN 160 MG
2000 TABLET,DISINTEGRATING ORAL DAILY
COMMUNITY

## 2025-07-16 SDOH — ECONOMIC STABILITY: FOOD INSECURITY: WITHIN THE PAST 12 MONTHS, THE FOOD YOU BOUGHT JUST DIDN'T LAST AND YOU DIDN'T HAVE MONEY TO GET MORE.: NEVER TRUE

## 2025-07-16 SDOH — ECONOMIC STABILITY: FOOD INSECURITY: WITHIN THE PAST 12 MONTHS, YOU WORRIED THAT YOUR FOOD WOULD RUN OUT BEFORE YOU GOT MONEY TO BUY MORE.: NEVER TRUE

## 2025-07-16 ASSESSMENT — PATIENT HEALTH QUESTIONNAIRE - PHQ9
SUM OF ALL RESPONSES TO PHQ QUESTIONS 1-9: 0
SUM OF ALL RESPONSES TO PHQ QUESTIONS 1-9: 0
1. LITTLE INTEREST OR PLEASURE IN DOING THINGS: NOT AT ALL
2. FEELING DOWN, DEPRESSED OR HOPELESS: NOT AT ALL
SUM OF ALL RESPONSES TO PHQ QUESTIONS 1-9: 0
SUM OF ALL RESPONSES TO PHQ QUESTIONS 1-9: 0

## 2025-07-16 ASSESSMENT — ENCOUNTER SYMPTOMS
DIARRHEA: 0
NAUSEA: 0
COUGH: 0
VOMITING: 0
CHEST TIGHTNESS: 0
SHORTNESS OF BREATH: 0

## 2025-07-16 NOTE — PROGRESS NOTES
later experienced dizziness and a sensation of the room spinning. She consumes one cup of coffee in the morning.    Social History:  Hobbies: Walking, gym workouts  Diet: Healthy diet, primarily consuming chicken and turkey meat  Coffee/Tea/Caffeine-containing Drinks: One cup of coffee in the morning         Allergies:No Known Allergies    Current Medications:   Medications marked \"taking\" at this time:    Current Outpatient Medications:     OVIDREL 250 MCG/0.5ML SOSY, Inject 250 mcg into the skin every 30 days, Disp: , Rfl:     letrozole (FEMARA) 2.5 MG tablet, Take 3 tablets by mouth  TAKE 3 TABLETS BY MOUTH DAILY CYCLE DAYS 3-10, Disp: , Rfl:     vitamin D (VITAMIN D3) 50 MCG (2000 UT) CAPS capsule, Take 1 capsule by mouth daily, Disp: , Rfl:     amLODIPine (NORVASC) 10 MG tablet, Take 1 tablet by mouth daily, Disp: 90 tablet, Rfl: 0    metFORMIN (GLUCOPHAGE) 500 MG tablet, Take 1 tablet by mouth 2 times daily (with meals), Disp: 180 tablet, Rfl: 1    medroxyPROGESTERone (PROVERA) 10 MG tablet, Take 1 tablet by mouth daily for 7 days, Disp: 7 tablet, Rfl: 0    vitamin D (ERGOCALCIFEROL) 1.25 MG (70131 UT) CAPS capsule, Take 1 capsule by mouth once a week (Patient not taking: Reported on 7/16/2025), Disp: 8 capsule, Rfl: 0    Current Problem List:   Patient Active Problem List   Diagnosis    Obesity, morbid (HCC)    PCOS (polycystic ovarian syndrome)       Social History:   Social History     Tobacco Use    Smoking status: Never    Smokeless tobacco: Never   Substance Use Topics    Alcohol use: Not Currently       Family History:   Family History   Problem Relation Age of Onset    No Known Problems Maternal Grandmother     No Known Problems Paternal Grandfather     Prostate Cancer Father     Diabetes Father     High Blood Pressure Father     High Cholesterol Father     Hypertension Father     No Known Problems Maternal Grandfather     Stroke Paternal Grandmother     Hypertension Mother     Diabetes Mother     High

## 2025-07-18 DIAGNOSIS — R73.9 ELEVATED RANDOM BLOOD GLUCOSE LEVEL: Chronic | ICD-10-CM

## 2025-07-18 DIAGNOSIS — I10 HYPERTENSION, UNSPECIFIED TYPE: ICD-10-CM

## 2025-07-18 DIAGNOSIS — Z13.220 SCREENING FOR HYPERLIPIDEMIA: ICD-10-CM

## 2025-07-18 LAB
ALBUMIN SERPL-MCNC: 3.9 G/DL (ref 3.5–5)
ALBUMIN/GLOB SERPL: 1.3 (ref 1–1.9)
ALP SERPL-CCNC: 123 U/L (ref 35–104)
ALT SERPL-CCNC: 620 U/L (ref 8–45)
ANION GAP SERPL CALC-SCNC: 12 MMOL/L (ref 7–16)
AST SERPL-CCNC: 506 U/L (ref 15–37)
BILIRUB SERPL-MCNC: 0.6 MG/DL (ref 0–1.2)
BUN SERPL-MCNC: 13 MG/DL (ref 6–23)
CALCIUM SERPL-MCNC: 9.5 MG/DL (ref 8.8–10.2)
CHLORIDE SERPL-SCNC: 103 MMOL/L (ref 98–107)
CHOLEST SERPL-MCNC: 209 MG/DL (ref 0–200)
CO2 SERPL-SCNC: 26 MMOL/L (ref 20–29)
CREAT SERPL-MCNC: 0.81 MG/DL (ref 0.6–1.1)
EST. AVERAGE GLUCOSE BLD GHB EST-MCNC: 107 MG/DL
GLOBULIN SER CALC-MCNC: 2.9 G/DL (ref 2.3–3.5)
GLUCOSE SERPL-MCNC: 96 MG/DL (ref 70–99)
HBA1C MFR BLD: 5.4 % (ref 0–5.6)
HDLC SERPL-MCNC: 61 MG/DL (ref 40–60)
HDLC SERPL: 3.4 (ref 0–5)
LDLC SERPL CALC-MCNC: 134 MG/DL (ref 0–100)
POTASSIUM SERPL-SCNC: 3.8 MMOL/L (ref 3.5–5.1)
PROT SERPL-MCNC: 6.8 G/DL (ref 6.3–8.2)
SODIUM SERPL-SCNC: 140 MMOL/L (ref 136–145)
TRIGL SERPL-MCNC: 72 MG/DL (ref 0–150)
VLDLC SERPL CALC-MCNC: 14 MG/DL (ref 6–23)

## 2025-07-30 ENCOUNTER — OFFICE VISIT (OUTPATIENT)
Age: 35
End: 2025-07-30
Payer: COMMERCIAL

## 2025-07-30 ENCOUNTER — TELEPHONE (OUTPATIENT)
Age: 35
End: 2025-07-30

## 2025-07-30 VITALS
TEMPERATURE: 99.7 F | HEART RATE: 123 BPM | WEIGHT: 236 LBS | DIASTOLIC BLOOD PRESSURE: 97 MMHG | OXYGEN SATURATION: 97 % | BODY MASS INDEX: 44.56 KG/M2 | HEIGHT: 61 IN | SYSTOLIC BLOOD PRESSURE: 143 MMHG

## 2025-07-30 DIAGNOSIS — R79.89 ELEVATED LFTS: Primary | ICD-10-CM

## 2025-07-30 PROCEDURE — 99204 OFFICE O/P NEW MOD 45 MIN: CPT | Performed by: INTERNAL MEDICINE

## 2025-07-30 NOTE — PROGRESS NOTES
Gastroenterology Office Visit Note    Soco Rae (:  1990) is a 35 y.o. female, New patient here for evaluation of the following chief complaint(s):  New Patient (Transaminitis//Abnormal labs)      Subjective   HPI  Ms. Rae is referred for elevated liver chemistries. Her AST/ALT were found to be high incidentally on a routine CMP she had done on her last visit with her PCP on 2025. At that time, she denies taking any antibiotics, NSAIDs, acetaminophen or any other new drug. She also denies alcohol consumption (she rarely drinks if at all). She also denies any recent acute illness. Her ALT and AST have been elevated up to 506/620, with alkaline phosphatase being 23, bilirubin 0.6.  She also denies any jaundice.  She has never had any issues as she is aware of with her liver in the past.  No family history of liver disorders as well. Currently, she is asymptomatic.         Review of Systems   All other systems reviewed and are negative.      Past Medical History:   Diagnosis Date    39 weeks gestation of pregnancy 2019    Elevated blood pressure affecting pregnancy in third trimester, antepartum 2019    PEC labs wnl, p/c ratio 488 (19), Dr. Oh spoke with Dr. De La Cruz who   recommends the following:  pt to be seen tomorrow for growth and dopplers   (19) if BP or growth or dopplers or symptoms admit for steroids and   24 hour urine - if all ok tomorrow (19) can do 24 hour urine at home   with f/u Monday   24 hour urine-238 mgs Protein.  10/2/2019 at [facility]:  Pt with recent BP elevatio    Encounter for immunization 2019    Declines Flu vaccine         Encounter for planned induction of labor 2019    Epilepsy (HCC)     as a child - nothing since    High-risk pregnancy in third trimester 2019 at [facility]: Normal NT 1.7mm, nasal bone seen. NIPT drawn today--->   Low risk x3.  2019 at [facility]:  Normal anatomy, however very limited due to MBH.

## 2025-08-06 DIAGNOSIS — R79.89 ELEVATED LFTS: ICD-10-CM

## 2025-08-06 LAB
ALBUMIN SERPL-MCNC: 4.1 G/DL (ref 3.5–5)
ALBUMIN/GLOB SERPL: 1.5 (ref 1–1.9)
ALP SERPL-CCNC: 91 U/L (ref 35–104)
ALT SERPL-CCNC: 47 U/L (ref 8–45)
ANION GAP SERPL CALC-SCNC: 14 MMOL/L (ref 7–16)
AST SERPL-CCNC: 27 U/L (ref 15–37)
BILIRUB SERPL-MCNC: 0.6 MG/DL (ref 0–1.2)
BUN SERPL-MCNC: 11 MG/DL (ref 6–23)
CALCIUM SERPL-MCNC: 9.6 MG/DL (ref 8.8–10.2)
CHLORIDE SERPL-SCNC: 101 MMOL/L (ref 98–107)
CO2 SERPL-SCNC: 26 MMOL/L (ref 20–29)
CREAT SERPL-MCNC: 0.78 MG/DL (ref 0.6–1.1)
ERYTHROCYTE [DISTWIDTH] IN BLOOD BY AUTOMATED COUNT: 12 % (ref 11.9–14.6)
GLOBULIN SER CALC-MCNC: 2.8 G/DL (ref 2.3–3.5)
GLUCOSE SERPL-MCNC: 98 MG/DL (ref 70–99)
HAV IGM SER QL: NONREACTIVE
HBV SURFACE AB SERPL IA-ACNC: <3.5 MIU/ML
HBV SURFACE AG SER QL: NONREACTIVE
HCT VFR BLD AUTO: 37.9 % (ref 35.8–46.3)
HCV AB SER QL: NONREACTIVE
HGB BLD-MCNC: 12.9 G/DL (ref 11.7–15.4)
INR PPP: 1
MCH RBC QN AUTO: 28.9 PG (ref 26.1–32.9)
MCHC RBC AUTO-ENTMCNC: 34 G/DL (ref 31.4–35)
MCV RBC AUTO: 85 FL (ref 82–102)
NRBC # BLD: 0 K/UL (ref 0–0.2)
PLATELET # BLD AUTO: 235 K/UL (ref 150–450)
PMV BLD AUTO: 9.4 FL (ref 9.4–12.3)
POTASSIUM SERPL-SCNC: 3.7 MMOL/L (ref 3.5–5.1)
PROT SERPL-MCNC: 6.9 G/DL (ref 6.3–8.2)
PROTHROMBIN TIME: 12.7 SEC (ref 11.3–14.9)
RBC # BLD AUTO: 4.46 M/UL (ref 4.05–5.2)
SODIUM SERPL-SCNC: 140 MMOL/L (ref 136–145)
WBC # BLD AUTO: 7 K/UL (ref 4.3–11.1)

## 2025-08-07 LAB
A1AT SERPL-MCNC: 127 MG/DL (ref 100–188)
ANA SER QL: NEGATIVE
CERULOPLASMIN SERPL-MCNC: 16.8 MG/DL (ref 19–39)
LKM-1 AB SER-ACNC: 1.2 UNITS (ref 0–20)
MITOCHONDRIA M2 IGG SER-ACNC: <20 UNITS (ref 0–20)
SMA IGG SER-ACNC: 6 UNITS (ref 0–19)

## 2025-08-08 ENCOUNTER — HOSPITAL ENCOUNTER (OUTPATIENT)
Dept: ULTRASOUND IMAGING | Age: 35
Discharge: HOME | End: 2025-08-11
Payer: COMMERCIAL

## 2025-08-08 DIAGNOSIS — R79.89 ELEVATED LFTS: ICD-10-CM

## 2025-08-08 PROCEDURE — 76700 US EXAM ABDOM COMPLETE: CPT

## 2025-08-11 DIAGNOSIS — R79.89 ELEVATED LFTS: Primary | ICD-10-CM

## 2025-08-13 ENCOUNTER — OFFICE VISIT (OUTPATIENT)
Dept: FAMILY MEDICINE CLINIC | Facility: CLINIC | Age: 35
End: 2025-08-13
Payer: COMMERCIAL

## 2025-08-13 VITALS
OXYGEN SATURATION: 100 % | WEIGHT: 236.8 LBS | TEMPERATURE: 97.5 F | HEIGHT: 61 IN | BODY MASS INDEX: 44.71 KG/M2 | SYSTOLIC BLOOD PRESSURE: 135 MMHG | DIASTOLIC BLOOD PRESSURE: 83 MMHG | HEART RATE: 90 BPM

## 2025-08-13 DIAGNOSIS — E78.5 HYPERLIPIDEMIA, UNSPECIFIED HYPERLIPIDEMIA TYPE: Chronic | ICD-10-CM

## 2025-08-13 DIAGNOSIS — I10 HYPERTENSION, UNSPECIFIED TYPE: Primary | Chronic | ICD-10-CM

## 2025-08-13 PROCEDURE — 3079F DIAST BP 80-89 MM HG: CPT

## 2025-08-13 PROCEDURE — 3075F SYST BP GE 130 - 139MM HG: CPT

## 2025-08-13 PROCEDURE — 99214 OFFICE O/P EST MOD 30 MIN: CPT

## 2025-08-13 RX ORDER — AMLODIPINE BESYLATE 10 MG/1
10 TABLET ORAL DAILY
Qty: 90 TABLET | Refills: 1 | Status: SHIPPED | OUTPATIENT
Start: 2025-08-13 | End: 2026-02-09

## 2025-08-13 ASSESSMENT — PATIENT HEALTH QUESTIONNAIRE - PHQ9
SUM OF ALL RESPONSES TO PHQ QUESTIONS 1-9: 0
1. LITTLE INTEREST OR PLEASURE IN DOING THINGS: NOT AT ALL
SUM OF ALL RESPONSES TO PHQ QUESTIONS 1-9: 0
2. FEELING DOWN, DEPRESSED OR HOPELESS: NOT AT ALL

## 2025-08-13 ASSESSMENT — ENCOUNTER SYMPTOMS
SHORTNESS OF BREATH: 0
NAUSEA: 0
VOMITING: 0
DIARRHEA: 0
COUGH: 0
CHEST TIGHTNESS: 0

## 2025-08-14 DIAGNOSIS — R79.89 ELEVATED LFTS: ICD-10-CM

## 2025-08-19 LAB
COLLECT DURATION TIME UR: 1500 HR
COPPER 24H UR-MRATE: 0 UG/24 HR (ref 3–35)
COPPER UR-MCNC: 6 UG/L
COPPER/CREAT UR: 7 UG/G CREAT (ref 0–49)
CREAT UR-MCNC: 0.9 G/L (ref 0.3–3)
SPECIMEN VOL ?TM UR: 24 ML

## 2025-08-22 ENCOUNTER — OFFICE VISIT (OUTPATIENT)
Age: 35
End: 2025-08-22
Payer: COMMERCIAL

## 2025-08-22 VITALS
BODY MASS INDEX: 44.75 KG/M2 | OXYGEN SATURATION: 96 % | HEART RATE: 93 BPM | SYSTOLIC BLOOD PRESSURE: 136 MMHG | WEIGHT: 237 LBS | HEIGHT: 61 IN | TEMPERATURE: 97.8 F | RESPIRATION RATE: 17 BRPM | DIASTOLIC BLOOD PRESSURE: 92 MMHG

## 2025-08-22 DIAGNOSIS — R79.89 ELEVATED LFTS: Primary | ICD-10-CM

## 2025-08-22 PROCEDURE — 99214 OFFICE O/P EST MOD 30 MIN: CPT | Performed by: INTERNAL MEDICINE

## (undated) DEVICE — TRAY CATH 16FR PVC INTMIT STR TIP PREATTACH TO 1000ML COLL

## (undated) DEVICE — SURGICAL PROCEDURE PACK C SECT CDS

## (undated) DEVICE — CATH FOL TY IC BAG 16FR 2000ML -- CONVERT TO ITEM 363158

## (undated) DEVICE — AMD ANTIMICROBIAL GAUZE SPONGES,12 PLY USP TYPE VII, 0.2% POLYHEXAMETHYLENE BIGUANIDE HCI (PHMB): Brand: CURITY

## (undated) DEVICE — STERILE POLYISOPRENE POWDER-FREE SURGICAL GLOVES: Brand: PROTEXIS

## (undated) DEVICE — SUTURE VCRL SZ 0 L36IN ABSRB UD CTX-B 1/2 CIR BLNT PNT NDL JB978

## (undated) DEVICE — PREP SKN DURAPREP 26ML APPL --

## (undated) DEVICE — SUTURE VCRL 3-0 L36IN ABSRB UD CTB-1 L36MM 1/2 CIR NDL JB944

## (undated) DEVICE — SOLUTION IV 1000ML 0.9% SOD CHL

## (undated) DEVICE — SOLUTION IRRIG 1000ML H2O STRL BLT

## (undated) DEVICE — PENCIL ES L3M BTTN SWCH S STL HEX LOK BLDE ELECTRD HOLSTER

## (undated) DEVICE — SUTURE MCRYL SZ 4-0 L27IN ABSRB UD L19MM PS-2 1/2 CIR PRIM Y426H

## (undated) DEVICE — AMD ANTIMICROBIAL NON-ADHERENT PAD,0.2% POLYHEXAMETHYLENE BIGUANIDE HCI (PHMB): Brand: TELFA

## (undated) DEVICE — REM POLYHESIVE ADULT PATIENT RETURN ELECTRODE: Brand: VALLEYLAB

## (undated) DEVICE — KENDALL SCD EXPRESS SLEEVES, KNEE LENGTH, MEDIUM: Brand: KENDALL SCD